# Patient Record
Sex: MALE | Race: WHITE | NOT HISPANIC OR LATINO | Employment: OTHER | URBAN - NONMETROPOLITAN AREA
[De-identification: names, ages, dates, MRNs, and addresses within clinical notes are randomized per-mention and may not be internally consistent; named-entity substitution may affect disease eponyms.]

---

## 2017-05-17 ENCOUNTER — OFFICE VISIT (OUTPATIENT)
Dept: CARDIOLOGY | Facility: CLINIC | Age: 47
End: 2017-05-17

## 2017-05-17 VITALS
HEART RATE: 62 BPM | RESPIRATION RATE: 18 BRPM | BODY MASS INDEX: 20.22 KG/M2 | WEIGHT: 103 LBS | HEIGHT: 60 IN | OXYGEN SATURATION: 96 % | SYSTOLIC BLOOD PRESSURE: 118 MMHG | DIASTOLIC BLOOD PRESSURE: 78 MMHG

## 2017-05-17 DIAGNOSIS — I27.20 MODERATE TO SEVERE PULMONARY HYPERTENSION (HCC): ICD-10-CM

## 2017-05-17 DIAGNOSIS — Q24.9 CONGENITAL HEART DISEASE: Primary | ICD-10-CM

## 2017-05-17 DIAGNOSIS — I48.3 TYPICAL ATRIAL FLUTTER (HCC): ICD-10-CM

## 2017-05-17 DIAGNOSIS — I44.1 WENCKEBACH: ICD-10-CM

## 2017-05-17 PROCEDURE — 93000 ELECTROCARDIOGRAM COMPLETE: CPT | Performed by: PHYSICIAN ASSISTANT

## 2017-05-17 PROCEDURE — 99213 OFFICE O/P EST LOW 20 MIN: CPT | Performed by: PHYSICIAN ASSISTANT

## 2018-07-19 ENCOUNTER — OFFICE VISIT (OUTPATIENT)
Dept: CARDIOLOGY | Facility: CLINIC | Age: 48
End: 2018-07-19

## 2018-07-19 VITALS
HEIGHT: 60 IN | DIASTOLIC BLOOD PRESSURE: 72 MMHG | RESPIRATION RATE: 16 BRPM | SYSTOLIC BLOOD PRESSURE: 130 MMHG | HEART RATE: 60 BPM | WEIGHT: 103.4 LBS | BODY MASS INDEX: 20.3 KG/M2

## 2018-07-19 DIAGNOSIS — Q24.9 CONGENITAL HEART DISEASE: ICD-10-CM

## 2018-07-19 DIAGNOSIS — I27.20 MODERATE TO SEVERE PULMONARY HYPERTENSION (HCC): Primary | ICD-10-CM

## 2018-07-19 PROCEDURE — 93000 ELECTROCARDIOGRAM COMPLETE: CPT | Performed by: PHYSICIAN ASSISTANT

## 2018-07-19 PROCEDURE — 99213 OFFICE O/P EST LOW 20 MIN: CPT | Performed by: PHYSICIAN ASSISTANT

## 2018-07-19 NOTE — PROGRESS NOTES
Jeramy Lai MD  Trevor Navarro  1970 07/19/2018    Patient Active Problem List   Diagnosis   • Congenital heart disease with pulmonary stenosis and atrial septal defect repain as a child.    • Moderate pulmonary hypertension on last echocardiogram in October 2014.   • Atrial flutter, with CHADS-VASC score of 0, hence low stroke rish without anticoagulation.        Dear Jeramy Lai MD:    Subjective       History of Present Illness:    Chief Compliant: Atrial flutter    Trevor Navarro is a pleasant 48 y.o. male with a past medical history significant for congenital heart disease with repair of ASD and pulmonic stenosis as a child. He also has a history of atrial flutter with controlled rate and mobitz type I AV block which was previously persistent and asymptomatic.  He comes in today for cardiology follow-up.  His father answers all of his questions for him.  His father states that he's been asymptomatic with no complaints stating that he has not complained of chest pain, palpitations, shortness of breath, dizziness, or syncope.  He states his heart rate normally stays pretty low and has not gotten above on drip that he can tell.  He is still healthy and does not have blood pressure problems, diabetes, or any coronary artery disease or any history of stroke or TIA.      Not on File:      Current Outpatient Prescriptions:   •  aspirin 81 MG EC tablet, Take 81 mg by mouth daily., Disp: , Rfl:   •  loratadine (CLARITIN) 5 MG/5ML syrup, Take  by mouth., Disp: , Rfl:   •  PHENobarbital (LUMINAL) 32.4 MG tablet, Take 16.2 mg by mouth 2 (two) times a day., Disp: , Rfl:       The following portions of the patient's history were reviewed and updated as appropriate: allergies, current medications, past family history, past medical history, past social history, past surgical history and problem list.    Social History   Substance Use Topics   • Smoking status: Never Smoker   • Smokeless tobacco:  "Never Used   • Alcohol use No       Review of Systems   Unable to perform ROS: patient nonverbal       Objective   Vitals:    07/19/18 1429   BP: 130/72   BP Location: Right arm   Pulse: 60   Resp: 16   Weight: 46.9 kg (103 lb 6.4 oz)   Height: 142.2 cm (55.98\")     Body mass index is 23.2 kg/m².        Physical Exam   Constitutional: He appears well-nourished. No distress.   Cardiovascular: Normal heart sounds.  An irregularly irregular rhythm present. Exam reveals no gallop.    No murmur heard.  Pulmonary/Chest: Effort normal and breath sounds normal.   Musculoskeletal: He exhibits no edema.   Neurological:   Patient with intellectual disability.   Skin: He is not diaphoretic.       No results found for: NA, K, CL, CO2, BUN, CREATININE, LABGLOM, GLUCOSE, CALCIUM, AST, ALT, ALKPHOS, LABIL2  No results found for: CKTOTAL  No results found for: WBC, HGB, HCT, PLT  No results found for: INR  No results found for: MG  No results found for: TSH, PSA, CHLPL, TRIG, HDL, LDL   No results found for: BNP          ECG 12 Lead  Date/Time: 7/19/2018 2:32 PM  Performed by: SHANNAN KAUR  Authorized by: SHANNAN KAUR   Comparison: compared with previous ECG from 5/17/2017  Similar to previous ECG  Rhythm: atrial flutter  Clinical impression: abnormal ECG              Assessment/Plan    Diagnosis Plan   1. Moderate pulmonary hypertension on last echocardiogram in October 2014.  Adult Transthoracic Echo Complete W/ Cont if Necessary Per Protocol   2. Congenital heart disease with pulmonary stenosis and atrial septal defect repain as a child.                   Recommendations:  1. Since it has been over a year since we last checked an echo for his pulmonary hypertension, I will order one to reevaluate.   1. Chads VASC score remains at 0.  Hence, we'll not pursue anticoagulation at this time.  2. Follow up in one year.   Return in about 1 year (around 7/19/2019).    As always, I appreciate very much the opportunity to " participate in the cardiovascular care of your patients.      With Best Regards,    JAMAL Delgado disclaimer:  Much of this encounter note is an electronic transcription/translation of spoken language to printed text. The electronic translation of spoken language may permit erroneous, or at times, nonsensical words or phrases to be inadvertently transcribed; Although I have reviewed the note for such errors, some may still exist.

## 2018-07-23 ENCOUNTER — HOSPITAL ENCOUNTER (OUTPATIENT)
Dept: CARDIOLOGY | Facility: HOSPITAL | Age: 48
Discharge: HOME | End: 2018-07-23
Admitting: PHYSICIAN ASSISTANT

## 2018-07-23 DIAGNOSIS — I27.20 MODERATE TO SEVERE PULMONARY HYPERTENSION (HCC): ICD-10-CM

## 2018-07-23 PROCEDURE — 93306 TTE W/DOPPLER COMPLETE: CPT

## 2018-07-23 PROCEDURE — 93306 TTE W/DOPPLER COMPLETE: CPT | Performed by: INTERNAL MEDICINE

## 2018-07-27 LAB
BH CV ECHO MEAS - BSA(HAYCOCK): 1.4 M^2
BH CV ECHO MEAS - BSA: 1.3 M^2
BH CV ECHO MEAS - BZI_BMI: 23.1 KILOGRAMS/M^2
BH CV ECHO MEAS - BZI_METRIC_HEIGHT: 142.2 CM
BH CV ECHO MEAS - BZI_METRIC_WEIGHT: 46.7 KG
BH CV ECHO MEAS - CONTRAST EF 4CH: 61.9 ML/M^2
BH CV ECHO MEAS - EDV(MOD-SP4): 21 ML
BH CV ECHO MEAS - EF(MOD-SP4): 61.9 %
BH CV ECHO MEAS - ESV(MOD-SP4): 8 ML
BH CV ECHO MEAS - LV DIASTOLIC VOL/BSA (35-75): 15.7 ML/M^2
BH CV ECHO MEAS - LV SYSTOLIC VOL/BSA (12-30): 6 ML/M^2
BH CV ECHO MEAS - LVLD AP4: 5.8 CM
BH CV ECHO MEAS - LVLS AP4: 5.9 CM
BH CV ECHO MEAS - MV A MAX VEL: 38 CM/SEC
BH CV ECHO MEAS - MV E MAX VEL: 94.3 CM/SEC
BH CV ECHO MEAS - MV E/A: 2.5
BH CV ECHO MEAS - RAP SYSTOLE: 10 MMHG
BH CV ECHO MEAS - RVSP: 55.7 MMHG
BH CV ECHO MEAS - SI(MOD-SP4): 9.7 ML/M^2
BH CV ECHO MEAS - SV(MOD-SP4): 13 ML
BH CV ECHO MEAS - TR MAX VEL: 338 CM/SEC
MAXIMAL PREDICTED HEART RATE: 173 BPM
STRESS TARGET HR: 147 BPM

## 2018-07-30 ENCOUNTER — TELEPHONE (OUTPATIENT)
Dept: CARDIOLOGY | Facility: CLINIC | Age: 48
End: 2018-07-30

## 2018-07-30 DIAGNOSIS — I27.20 MODERATE TO SEVERE PULMONARY HYPERTENSION (HCC): Primary | ICD-10-CM

## 2018-07-30 NOTE — TELEPHONE ENCOUNTER
"Spoke with Carmen mother.  Per his mother,  Trevor will not as much keep a bandaid on his finger without tearing it off and \"throwing a fit\" much less a pulse ox all night (being special needs\". SHe stated he wouldn't do it.    "

## 2018-07-30 NOTE — TELEPHONE ENCOUNTER
----- Message from Kaiden Anegles PA-C sent at 7/30/2018 12:19 PM EDT -----  Let the patient know that his pressures in his lungs were still high so im gonna check an over night pulse ox. Please have him come in in a few weeks after he has the pulse ox done (if willing to do it)

## 2019-07-15 ENCOUNTER — OFFICE VISIT (OUTPATIENT)
Dept: CARDIOLOGY | Facility: CLINIC | Age: 49
End: 2019-07-15

## 2019-07-15 VITALS
HEIGHT: 60 IN | SYSTOLIC BLOOD PRESSURE: 127 MMHG | OXYGEN SATURATION: 95 % | DIASTOLIC BLOOD PRESSURE: 87 MMHG | BODY MASS INDEX: 20.81 KG/M2 | HEART RATE: 59 BPM | WEIGHT: 106 LBS

## 2019-07-15 DIAGNOSIS — I27.20 MODERATE TO SEVERE PULMONARY HYPERTENSION (HCC): ICD-10-CM

## 2019-07-15 DIAGNOSIS — I48.3 TYPICAL ATRIAL FLUTTER (HCC): ICD-10-CM

## 2019-07-15 DIAGNOSIS — Q24.9 CONGENITAL HEART DISEASE: Primary | ICD-10-CM

## 2019-07-15 PROCEDURE — 93000 ELECTROCARDIOGRAM COMPLETE: CPT | Performed by: NURSE PRACTITIONER

## 2019-07-15 PROCEDURE — 99213 OFFICE O/P EST LOW 20 MIN: CPT | Performed by: NURSE PRACTITIONER

## 2019-07-15 NOTE — PROGRESS NOTES
Jeramy Lai MD  Trevor Navarro  1970  07/15/2019    Patient Active Problem List   Diagnosis   • Congenital heart disease with pulmonary stenosis and atrial septal defect repain as a child.    • Moderate to severe pulmonary hypertension (CMS/HCC)   • Atrial flutter, with CHADS-VASC score of 0, hence low stroke rish without anticoagulation.        Dear Jeramy Lai MD:    Subjective     Chief Complaint   Patient presents with   • Moderate to severe pulmonary hypertension   • Congenital heart disease with pulmonary stenosis and atrial            History of Present Illness:    Trevor Navarro is a 49 y.o. male with a history of congenital heart disease with repair of ASD and pulmonic stenosis as a child.  He also has a history of atrial flutter with controlled rate, CHADSVASc score of 0 and Mobitz type I AV block which was previously persistent and asymptomatic.  He presents today for routine cardiology follow-up.  His father accompanies him on his visit today.  He does have a developmental delay.  His father reports he has been doing well.  He has occasional joint pain.  Otherwise, he has had no complaints of chest pain, shortness of breath, palpitations, dizziness, or lightheadedness.  Previously, an echocardiogram was ordered to evaluate the degree of pulmonary hypertension.  This revealed severe pulmonary hypertension.  However, the patient's parents declined further work-up as they did not feel he could tolerate any of this.  He does often awaken gasping for breath at night.  However, the patient's parents report he would not wear any type of CPAP or BiPAP if needed.          No Known Allergies:      Current Outpatient Medications:   •  aspirin 81 MG EC tablet, Take 81 mg by mouth daily., Disp: , Rfl:   •  loratadine (CLARITIN) 5 MG/5ML syrup, Take  by mouth., Disp: , Rfl:   •  PHENobarbital (LUMINAL) 32.4 MG tablet, Take 16.2 mg by mouth 2 (two) times a day., Disp: , Rfl:       The  "following portions of the patient's history were reviewed and updated as appropriate: allergies, current medications, past family history, past medical history, past social history, past surgical history and problem list.    Social History     Tobacco Use   • Smoking status: Never Smoker   • Smokeless tobacco: Never Used   Substance Use Topics   • Alcohol use: No   • Drug use: No       Review of Systems   Unable to perform ROS: patient nonverbal       Objective   Vitals:    07/15/19 0926   BP: 127/87   BP Location: Left arm   Patient Position: Sitting   Cuff Size: Adult   Pulse: 59   SpO2: 95%   Weight: 48.1 kg (106 lb)   Height: 142.2 cm (55.98\")     Body mass index is 23.78 kg/m².        Physical Exam   Constitutional: He appears well-developed and well-nourished.   HENT:   Head: Normocephalic and atraumatic.   Cardiovascular: Normal rate and normal heart sounds. An irregularly irregular rhythm present. Exam reveals no S3 and no S4.   No murmur heard.  Pulmonary/Chest: Effort normal and breath sounds normal. He has no wheezes. He has no rales.   Abdominal: Soft. Bowel sounds are normal.   Musculoskeletal: He exhibits no edema.   Neurological: He is alert.   Skin: Skin is warm and dry.   Psychiatric: He has a normal mood and affect. His behavior is normal.               ECG 12 Lead  Date/Time: 7/15/2019 9:31 AM  Performed by: Madhavi Martins APRN  Authorized by: Madhavi Martins APRN   Comparison: compared with previous ECG   Similar to previous ECG  Rhythm: atrial flutter  BPM: 62                Assessment/Plan    Diagnosis Plan   1. Congenital heart disease with pulmonary stenosis and atrial septal defect repain as a child.   Adult Transthoracic Echo Complete W/ Cont if Necessary Per Protocol   2. Moderate to severe pulmonary hypertension (CMS/HCC)  Adult Transthoracic Echo Complete W/ Cont if Necessary Per Protocol   3. Typical atrial flutter (CMS/HCC)  Adult Transthoracic Echo Complete W/ Cont if Necessary " Per Protocol                Recommendations:    1. Will re-evaluate the degree of pulmonary hypertension with an echocardiogram.    2. His CHADSVASc score is 0, therefore he does not need anticoagulation.    3. Follow up in 1 year and PRN.      Return in about 1 year (around 7/15/2020) for Recheck.    As always, I appreciate very much the opportunity to participate in the cardiovascular care of your patients.      With Best Regards,    ANNABELLE Ambriz

## 2019-07-24 ENCOUNTER — HOSPITAL ENCOUNTER (OUTPATIENT)
Dept: CARDIOLOGY | Facility: HOSPITAL | Age: 49
Discharge: HOME | End: 2019-07-24
Admitting: NURSE PRACTITIONER

## 2019-07-24 DIAGNOSIS — I48.3 TYPICAL ATRIAL FLUTTER (HCC): ICD-10-CM

## 2019-07-24 DIAGNOSIS — Q24.9 CONGENITAL HEART DISEASE: ICD-10-CM

## 2019-07-24 DIAGNOSIS — I27.20 MODERATE TO SEVERE PULMONARY HYPERTENSION (HCC): ICD-10-CM

## 2019-07-24 PROCEDURE — 93306 TTE W/DOPPLER COMPLETE: CPT | Performed by: INTERNAL MEDICINE

## 2019-07-24 PROCEDURE — 93306 TTE W/DOPPLER COMPLETE: CPT

## 2019-07-25 LAB
BH CV ECHO MEAS - % IVS THICK: 52.8 %
BH CV ECHO MEAS - % LVPW THICK: 42.8 %
BH CV ECHO MEAS - ACS: 1.5 CM
BH CV ECHO MEAS - AO MAX PG: 9.8 MMHG
BH CV ECHO MEAS - AO MEAN PG: 4.8 MMHG
BH CV ECHO MEAS - AO ROOT AREA (BSA CORRECTED): 1.6
BH CV ECHO MEAS - AO ROOT AREA: 3.7 CM^2
BH CV ECHO MEAS - AO ROOT DIAM: 2.2 CM
BH CV ECHO MEAS - AO V2 MAX: 156.9 CM/SEC
BH CV ECHO MEAS - AO V2 MEAN: 101.7 CM/SEC
BH CV ECHO MEAS - AO V2 VTI: 30.8 CM
BH CV ECHO MEAS - BSA(HAYCOCK): 1.4 M^2
BH CV ECHO MEAS - BSA: 1.3 M^2
BH CV ECHO MEAS - BZI_BMI: 24.6 KILOGRAMS/M^2
BH CV ECHO MEAS - BZI_METRIC_HEIGHT: 139.7 CM
BH CV ECHO MEAS - BZI_METRIC_WEIGHT: 48.1 KG
BH CV ECHO MEAS - EDV(CUBED): 54.5 ML
BH CV ECHO MEAS - EDV(MOD-SP4): 50 ML
BH CV ECHO MEAS - EDV(TEICH): 61.6 ML
BH CV ECHO MEAS - EF(CUBED): 56.6 %
BH CV ECHO MEAS - EF(MOD-SP4): 62 %
BH CV ECHO MEAS - EF(TEICH): 49 %
BH CV ECHO MEAS - ESV(CUBED): 23.7 ML
BH CV ECHO MEAS - ESV(MOD-SP4): 19 ML
BH CV ECHO MEAS - ESV(TEICH): 31.4 ML
BH CV ECHO MEAS - FS: 24.3 %
BH CV ECHO MEAS - IVS/LVPW: 1.2
BH CV ECHO MEAS - IVSD: 0.89 CM
BH CV ECHO MEAS - IVSS: 1.4 CM
BH CV ECHO MEAS - LA DIMENSION: 3.4 CM
BH CV ECHO MEAS - LA/AO: 1.6
BH CV ECHO MEAS - LV DIASTOLIC VOL/BSA (35-75): 37.4 ML/M^2
BH CV ECHO MEAS - LV MASS(C)D: 88 GRAMS
BH CV ECHO MEAS - LV MASS(C)DI: 65.8 GRAMS/M^2
BH CV ECHO MEAS - LV MASS(C)S: 104.1 GRAMS
BH CV ECHO MEAS - LV MASS(C)SI: 77.8 GRAMS/M^2
BH CV ECHO MEAS - LV SYSTOLIC VOL/BSA (12-30): 14.2 ML/M^2
BH CV ECHO MEAS - LVIDD: 3.8 CM
BH CV ECHO MEAS - LVIDS: 2.9 CM
BH CV ECHO MEAS - LVLD AP4: 6.2 CM
BH CV ECHO MEAS - LVLS AP4: 6 CM
BH CV ECHO MEAS - LVOT AREA (M): 2 CM^2
BH CV ECHO MEAS - LVOT AREA: 2 CM^2
BH CV ECHO MEAS - LVOT DIAM: 1.6 CM
BH CV ECHO MEAS - LVPWD: 0.74 CM
BH CV ECHO MEAS - LVPWS: 1.1 CM
BH CV ECHO MEAS - MV A MAX VEL: 71.6 CM/SEC
BH CV ECHO MEAS - MV E MAX VEL: 124.5 CM/SEC
BH CV ECHO MEAS - MV E/A: 1.7
BH CV ECHO MEAS - PA MAX PG (FULL): 10.3 MMHG
BH CV ECHO MEAS - PA MAX PG: 16.3 MMHG
BH CV ECHO MEAS - PA MEAN PG (FULL): 3.5 MMHG
BH CV ECHO MEAS - PA MEAN PG: 5.6 MMHG
BH CV ECHO MEAS - PA V2 MAX: 202.1 CM/SEC
BH CV ECHO MEAS - PA V2 MEAN: 104 CM/SEC
BH CV ECHO MEAS - PA V2 VTI: 36.3 CM
BH CV ECHO MEAS - PVA(I,A): 1.4 CM^2
BH CV ECHO MEAS - PVA(I,D): 1.4 CM^2
BH CV ECHO MEAS - PVA(V,A): 1.4 CM^2
BH CV ECHO MEAS - PVA(V,D): 1.4 CM^2
BH CV ECHO MEAS - RAP SYSTOLE: 10 MMHG
BH CV ECHO MEAS - RV MAX PG: 6 MMHG
BH CV ECHO MEAS - RV MEAN PG: 2.1 MMHG
BH CV ECHO MEAS - RV V1 MAX: 122.8 CM/SEC
BH CV ECHO MEAS - RV V1 MEAN: 63.5 CM/SEC
BH CV ECHO MEAS - RV V1 VTI: 22.4 CM
BH CV ECHO MEAS - RVOT AREA: 2.3 CM^2
BH CV ECHO MEAS - RVOT DIAM: 1.7 CM
BH CV ECHO MEAS - RVSP: 56.5 MMHG
BH CV ECHO MEAS - SI(AO): 85.9 ML/M^2
BH CV ECHO MEAS - SI(CUBED): 23 ML/M^2
BH CV ECHO MEAS - SI(MOD-SP4): 23.2 ML/M^2
BH CV ECHO MEAS - SI(TEICH): 22.5 ML/M^2
BH CV ECHO MEAS - SV(AO): 114.9 ML
BH CV ECHO MEAS - SV(CUBED): 30.8 ML
BH CV ECHO MEAS - SV(MOD-SP4): 31 ML
BH CV ECHO MEAS - SV(RVOT): 51.9 ML
BH CV ECHO MEAS - SV(TEICH): 30.2 ML
BH CV ECHO MEAS - TR MAX VEL: 340.9 CM/SEC
MAXIMAL PREDICTED HEART RATE: 172 BPM
STRESS TARGET HR: 146 BPM

## 2019-07-31 ENCOUNTER — TELEPHONE (OUTPATIENT)
Dept: CARDIOLOGY | Facility: CLINIC | Age: 49
End: 2019-07-31

## 2019-07-31 NOTE — TELEPHONE ENCOUNTER
----- Message from ANNABELLE Ambriz sent at 7/31/2019  9:36 AM EDT -----  EF is normal. He does have persistent severe pulmonary hypertension. The next step would be to order overnight oximetry to rule out sleep apnea. However, I do not believe his family wants to pursue this. Please make sure with them. Thank you.

## 2019-07-31 NOTE — TELEPHONE ENCOUNTER
Called and spoke with Carmen' mom and given message per DEE DEE ALANIS.  She wishes not to do the overnight pulse ox as he will not wear anything as a bandaid on his body.

## 2020-10-01 ENCOUNTER — OFFICE VISIT (OUTPATIENT)
Dept: CARDIOLOGY | Facility: CLINIC | Age: 50
End: 2020-10-01

## 2020-10-01 VITALS
TEMPERATURE: 98 F | DIASTOLIC BLOOD PRESSURE: 76 MMHG | BODY MASS INDEX: 24.45 KG/M2 | SYSTOLIC BLOOD PRESSURE: 130 MMHG | WEIGHT: 109 LBS | HEART RATE: 61 BPM

## 2020-10-01 DIAGNOSIS — I27.20 MODERATE TO SEVERE PULMONARY HYPERTENSION (HCC): ICD-10-CM

## 2020-10-01 DIAGNOSIS — Q24.9 CONGENITAL HEART DISEASE: ICD-10-CM

## 2020-10-01 DIAGNOSIS — I48.3 TYPICAL ATRIAL FLUTTER (HCC): Primary | ICD-10-CM

## 2020-10-01 PROCEDURE — 99441 PR PHYS/QHP TELEPHONE EVALUATION 5-10 MIN: CPT | Performed by: NURSE PRACTITIONER

## 2020-10-01 NOTE — PROGRESS NOTES
You have chosen to receive care through a telephone visit. Do you consent to use a telephone visit for your medical care today? Yes  Jeramy Lai MD  Trevor Navarro  1970  10/01/2020    Patient Active Problem List   Diagnosis   • Congenital heart disease with pulmonary stenosis and atrial septal defect repain as a child.    • Moderate to severe pulmonary hypertension (CMS/HCC)   • Atrial flutter, with CHADS-VASC score of 0, hence low stroke rish without anticoagulation.        Dear Jeramy Lai MD:    Subjective     Chief Complaint   Patient presents with   • Follow-up     1 yr    • Shortness of Breath      but it is the same as it has been    • Med Management     verbal           History of Present Illness:    Trevor Navarro is a 50 y.o. male with a past medical history significant for congenital heart disease with repair of ASD and pulmonic stenosis as a child. He has a history of atrial flutter with controlled rate, CHADSVASc score of 0 and Mobitz type I AV block which was previously asymptomatic. He presents today for cardiology follow up via telephone visit. He does have intellectual disability, therefore, his Father is also facilitating the visit. He reports he has been doing well. He denies any complaints of chest pain, dyspnea, or palpitations. An echocardiogram revealed an EF of 51-55% with mild mitral valve regurgitation and severe pulmonary hypertension, stable compared to last study. He does have Obstructive sleep apnea but is unable to tolerate a C-Pap.          No Known Allergies:      Current Outpatient Medications:   •  aspirin 81 MG EC tablet, Take 81 mg by mouth daily., Disp: , Rfl:   •  loratadine (CLARITIN) 5 MG/5ML syrup, Take  by mouth., Disp: , Rfl:   •  PHENobarbital (LUMINAL) 32.4 MG tablet, Take 16.2 mg by mouth 2 (two) times a day., Disp: , Rfl:       The following portions of the patient's history were reviewed and updated as appropriate: allergies, current  medications, past family history, past medical history, past social history, past surgical history and problem list.    Social History     Tobacco Use   • Smoking status: Never Smoker   • Smokeless tobacco: Never Used   Substance Use Topics   • Alcohol use: No   • Drug use: No       Review of Systems   Constitution: Negative for decreased appetite and malaise/fatigue.   Cardiovascular: Negative for chest pain, dyspnea on exertion, irregular heartbeat, leg swelling, near-syncope, orthopnea, palpitations, paroxysmal nocturnal dyspnea and syncope.   Respiratory: Negative for cough, shortness of breath and wheezing.    Neurological: Negative for dizziness, light-headedness and weakness.       Objective   Vitals:    10/01/20 1008   BP: 130/76   BP Location: Left arm   Patient Position: Sitting   Cuff Size: Large Adult   Pulse: 61   Temp: 98 °F (36.7 °C)   TempSrc: Temporal   Weight: 49.4 kg (109 lb)     Body mass index is 24.45 kg/m².        Physical Exam        Procedures      Assessment/Plan    Diagnosis Plan   1. Typical atrial flutter (CMS/HCC)     2. Congenital heart disease with pulmonary stenosis and atrial septal defect repain as a child.      3. Moderate to severe pulmonary hypertension (CMS/HCC)                  Recommendations:    1. I reviewed echocardiogram results with the patient's father.     2. The pulmonary hypertension may be secondary to the untreated ADITYA, however the patient has tried C-Pap in the past and cannot tolerate.    3. Follow up in 6 to 7 months in office with EKG or sooner if needed.    This visit has been rescheduled as a phone visit to comply with patient safety concerns in accordance with CDC recommendations. Total time of discussion was 7 minutes.          Patient's Body mass index is 24.45 kg/m². BMI is within normal parameters. No follow-up required..         Return in about 6 months (around 4/1/2021) for Recheck.    As always, I appreciate very much the opportunity to participate in  the cardiovascular care of your patients.      With Best Regards,    ANNABELLE Ambriz

## 2021-03-01 ENCOUNTER — APPOINTMENT (OUTPATIENT)
Dept: CT IMAGING | Facility: HOSPITAL | Age: 51
End: 2021-03-01

## 2021-03-01 ENCOUNTER — APPOINTMENT (OUTPATIENT)
Dept: GENERAL RADIOLOGY | Facility: HOSPITAL | Age: 51
End: 2021-03-01

## 2021-03-01 ENCOUNTER — HOSPITAL ENCOUNTER (EMERGENCY)
Facility: HOSPITAL | Age: 51
Discharge: HOME | End: 2021-03-01
Attending: FAMILY MEDICINE | Admitting: FAMILY MEDICINE

## 2021-03-01 VITALS
HEART RATE: 100 BPM | SYSTOLIC BLOOD PRESSURE: 138 MMHG | BODY MASS INDEX: 22.58 KG/M2 | HEIGHT: 60 IN | RESPIRATION RATE: 20 BRPM | TEMPERATURE: 98.8 F | WEIGHT: 115 LBS | DIASTOLIC BLOOD PRESSURE: 97 MMHG | OXYGEN SATURATION: 99 %

## 2021-03-01 DIAGNOSIS — B96.89 ACUTE BACTERIAL BRONCHITIS: Primary | ICD-10-CM

## 2021-03-01 DIAGNOSIS — J20.8 ACUTE BACTERIAL BRONCHITIS: Primary | ICD-10-CM

## 2021-03-01 DIAGNOSIS — R91.8 PULMONARY NODULES: ICD-10-CM

## 2021-03-01 LAB
ALBUMIN SERPL-MCNC: 4.09 G/DL (ref 3.5–5.2)
ALBUMIN/GLOB SERPL: 0.9 G/DL
ALP SERPL-CCNC: 91 U/L (ref 39–117)
ALT SERPL W P-5'-P-CCNC: 23 U/L (ref 1–41)
ANION GAP SERPL CALCULATED.3IONS-SCNC: 12.9 MMOL/L (ref 5–15)
AST SERPL-CCNC: 17 U/L (ref 1–40)
BACTERIA UR QL AUTO: NORMAL /HPF
BASOPHILS # BLD AUTO: 0.06 10*3/MM3 (ref 0–0.2)
BASOPHILS NFR BLD AUTO: 0.6 % (ref 0–1.5)
BILIRUB SERPL-MCNC: 0.3 MG/DL (ref 0–1.2)
BILIRUB UR QL STRIP: NEGATIVE
BUN SERPL-MCNC: 17 MG/DL (ref 6–20)
BUN/CREAT SERPL: 16.7 (ref 7–25)
CALCIUM SPEC-SCNC: 9.7 MG/DL (ref 8.6–10.5)
CHLORIDE SERPL-SCNC: 96 MMOL/L (ref 98–107)
CLARITY UR: CLEAR
CO2 SERPL-SCNC: 26.1 MMOL/L (ref 22–29)
COLOR UR: YELLOW
CREAT SERPL-MCNC: 1.02 MG/DL (ref 0.76–1.27)
DEPRECATED RDW RBC AUTO: 46.9 FL (ref 37–54)
EOSINOPHIL # BLD AUTO: 0.12 10*3/MM3 (ref 0–0.4)
EOSINOPHIL NFR BLD AUTO: 1.1 % (ref 0.3–6.2)
ERYTHROCYTE [DISTWIDTH] IN BLOOD BY AUTOMATED COUNT: 13.1 % (ref 12.3–15.4)
FLUAV RNA RESP QL NAA+PROBE: NOT DETECTED
FLUBV RNA RESP QL NAA+PROBE: NOT DETECTED
GFR SERPL CREATININE-BSD FRML MDRD: 78 ML/MIN/1.73
GLOBULIN UR ELPH-MCNC: 4.7 GM/DL
GLUCOSE SERPL-MCNC: 112 MG/DL (ref 65–99)
GLUCOSE UR STRIP-MCNC: NEGATIVE MG/DL
HCT VFR BLD AUTO: 47.7 % (ref 37.5–51)
HGB BLD-MCNC: 15.4 G/DL (ref 13–17.7)
HGB UR QL STRIP.AUTO: NEGATIVE
HOLD SPECIMEN: NORMAL
HOLD SPECIMEN: NORMAL
HYALINE CASTS UR QL AUTO: NORMAL /LPF
IMM GRANULOCYTES # BLD AUTO: 0.08 10*3/MM3 (ref 0–0.05)
IMM GRANULOCYTES NFR BLD AUTO: 0.7 % (ref 0–0.5)
KETONES UR QL STRIP: NEGATIVE
LEUKOCYTE ESTERASE UR QL STRIP.AUTO: NEGATIVE
LYMPHOCYTES # BLD AUTO: 2.69 10*3/MM3 (ref 0.7–3.1)
LYMPHOCYTES NFR BLD AUTO: 25.2 % (ref 19.6–45.3)
MCH RBC QN AUTO: 31.3 PG (ref 26.6–33)
MCHC RBC AUTO-ENTMCNC: 32.3 G/DL (ref 31.5–35.7)
MCV RBC AUTO: 97 FL (ref 79–97)
MONOCYTES # BLD AUTO: 1.06 10*3/MM3 (ref 0.1–0.9)
MONOCYTES NFR BLD AUTO: 9.9 % (ref 5–12)
NEUTROPHILS NFR BLD AUTO: 6.68 10*3/MM3 (ref 1.7–7)
NEUTROPHILS NFR BLD AUTO: 62.5 % (ref 42.7–76)
NITRITE UR QL STRIP: NEGATIVE
NRBC BLD AUTO-RTO: 0 /100 WBC (ref 0–0.2)
NT-PROBNP SERPL-MCNC: 1131 PG/ML (ref 0–450)
PH UR STRIP.AUTO: 5.5 [PH] (ref 5–8)
PLATELET # BLD AUTO: 396 10*3/MM3 (ref 140–450)
PMV BLD AUTO: 9.8 FL (ref 6–12)
POTASSIUM SERPL-SCNC: 4.5 MMOL/L (ref 3.5–5.2)
PROT SERPL-MCNC: 8.8 G/DL (ref 6–8.5)
PROT UR QL STRIP: ABNORMAL
RBC # BLD AUTO: 4.92 10*6/MM3 (ref 4.14–5.8)
RBC # UR: NORMAL /HPF
REF LAB TEST METHOD: NORMAL
SARS-COV-2 RNA RESP QL NAA+PROBE: NOT DETECTED
SODIUM SERPL-SCNC: 135 MMOL/L (ref 136–145)
SP GR UR STRIP: 1.01 (ref 1–1.03)
SQUAMOUS #/AREA URNS HPF: NORMAL /HPF
TROPONIN T SERPL-MCNC: <0.01 NG/ML (ref 0–0.03)
UROBILINOGEN UR QL STRIP: ABNORMAL
WBC # BLD AUTO: 10.69 10*3/MM3 (ref 3.4–10.8)
WBC UR QL AUTO: NORMAL /HPF
WHOLE BLOOD HOLD SPECIMEN: NORMAL
WHOLE BLOOD HOLD SPECIMEN: NORMAL

## 2021-03-01 PROCEDURE — 87636 SARSCOV2 & INF A&B AMP PRB: CPT | Performed by: PHYSICIAN ASSISTANT

## 2021-03-01 PROCEDURE — 71045 X-RAY EXAM CHEST 1 VIEW: CPT | Performed by: RADIOLOGY

## 2021-03-01 PROCEDURE — 99283 EMERGENCY DEPT VISIT LOW MDM: CPT

## 2021-03-01 PROCEDURE — 84484 ASSAY OF TROPONIN QUANT: CPT | Performed by: PHYSICIAN ASSISTANT

## 2021-03-01 PROCEDURE — 80053 COMPREHEN METABOLIC PANEL: CPT | Performed by: PHYSICIAN ASSISTANT

## 2021-03-01 PROCEDURE — 71275 CT ANGIOGRAPHY CHEST: CPT

## 2021-03-01 PROCEDURE — 71275 CT ANGIOGRAPHY CHEST: CPT | Performed by: RADIOLOGY

## 2021-03-01 PROCEDURE — 81001 URINALYSIS AUTO W/SCOPE: CPT | Performed by: PHYSICIAN ASSISTANT

## 2021-03-01 PROCEDURE — 0 IOPAMIDOL PER 1 ML: Performed by: FAMILY MEDICINE

## 2021-03-01 PROCEDURE — 85025 COMPLETE CBC W/AUTO DIFF WBC: CPT | Performed by: PHYSICIAN ASSISTANT

## 2021-03-01 PROCEDURE — 83880 ASSAY OF NATRIURETIC PEPTIDE: CPT | Performed by: PHYSICIAN ASSISTANT

## 2021-03-01 PROCEDURE — 71045 X-RAY EXAM CHEST 1 VIEW: CPT

## 2021-03-01 PROCEDURE — 87040 BLOOD CULTURE FOR BACTERIA: CPT | Performed by: PHYSICIAN ASSISTANT

## 2021-03-01 RX ORDER — CEFDINIR 250 MG/5ML
300 POWDER, FOR SUSPENSION ORAL 2 TIMES DAILY
Qty: 120 ML | Refills: 0 | Status: SHIPPED | OUTPATIENT
Start: 2021-03-01 | End: 2021-04-01

## 2021-03-01 RX ADMIN — IOPAMIDOL 100 ML: 755 INJECTION, SOLUTION INTRAVENOUS at 13:41

## 2021-03-01 NOTE — ED PROVIDER NOTES
"Subjective   50-year-old white male presents secondary to 1 week history of cough congestion and fever.  History is provided by his father.  Patient has not had any known exposure to COVID-19.  He does have a history of heart disease which was repaired when he was young.  He is followed by Dr. Peterson and a cardiologist in [place].  No increased lower extremity swelling.  He has had some nasal drainage and sore throat.  The father is being seen for similar symptoms though milder.  He is eating and drinking well.  No other complaints this time.          Review of Systems   Constitutional: Positive for fever.   HENT: Negative.    Respiratory: Positive for cough.    Cardiovascular: Negative.  Negative for chest pain.   Gastrointestinal: Negative.  Negative for abdominal pain.   Endocrine: Negative.    Genitourinary: Negative.  Negative for dysuria.   Skin: Negative.    Neurological: Negative.    Psychiatric/Behavioral: Negative.    All other systems reviewed and are negative.      Past Medical History:   Diagnosis Date   • Aortic insufficiency     MILD REGURGITAION, AND MILD TRICUSPID REGURGITAION.   • ASD (atrial septal defect)    • Atrial flutter (CMS/HCC)     [facility] CHADS-VASC SCORE OF 0, HENCE LOW STROKE RISK WITHOUT ANTICOAGULATION.   • AV block, Mobitz 1    • Bradycardia     WITH A SECOND DEGREE ATRIOVENTRICULAR BLOCK TYPE 1.. 10/17/2013   • Congenital heart disease     WITH PULMONARY STENOSIS AND ATRIAL SEPTAL DEFECT REPAIR AS A CHILD. 01/25/16   • Mental retardation    • Pulmonary hypertension (CMS/HCC)     MODERATE ON LAST ECHOCARDIOGRAM ON OCTOBER 2014.   • Seizure disorder (CMS/HCC)        No Known Allergies    Past Surgical History:   Procedure Laterality Date   • ASD AND VSD REPAIR     • FOOT SURGERY      \"orthopedic surgery to straighten his feet\"   • HERNIA REPAIR         No family history on file.    Social History     Socioeconomic History   • Marital status: Single     Spouse name: Not on file "   • Number of children: Not on file   • Years of education: Not on file   • Highest education level: Not on file   Tobacco Use   • Smoking status: Never Smoker   • Smokeless tobacco: Never Used   Substance and Sexual Activity   • Alcohol use: No   • Drug use: No   • Sexual activity: Defer           Objective   Physical Exam  Vitals signs and nursing note reviewed.   Constitutional:       General: He is not in acute distress.     Appearance: He is well-developed. He is not diaphoretic.   HENT:      Head: Normocephalic and atraumatic.      Right Ear: External ear normal.      Left Ear: External ear normal.      Nose: Nose normal.   Eyes:      Conjunctiva/sclera: Conjunctivae normal.      Pupils: Pupils are equal, round, and reactive to light.   Neck:      Musculoskeletal: Normal range of motion and neck supple.      Vascular: No JVD.      Trachea: No tracheal deviation.   Cardiovascular:      Rate and Rhythm: Normal rate and regular rhythm.      Heart sounds: Normal heart sounds. No murmur.   Pulmonary:      Effort: Pulmonary effort is normal. No respiratory distress.      Breath sounds: Normal breath sounds. No wheezing.   Abdominal:      General: Bowel sounds are normal.      Palpations: Abdomen is soft.      Tenderness: There is no abdominal tenderness.   Musculoskeletal: Normal range of motion.         General: No deformity.   Skin:     General: Skin is warm and dry.      Coloration: Skin is not pale.      Findings: No erythema or rash.   Neurological:      Mental Status: He is alert and oriented to person, place, and time.      Cranial Nerves: No cranial nerve deficit.   Psychiatric:         Behavior: Behavior normal.         Thought Content: Thought content normal.         Procedures           ED Course  ED Course as of Mar 01 1652   Mon Mar 01, 2021   1401 Discussed CAT scan findings with father.  He is aware the need for follow-up.  Patient is followed by a primary care and [place].  We will give him the  number for pulmonology to follow-up on CAT scan.  Father does wish for patient to be treated with antibiotics.  Patient does have some gum disease as well.  He requires liquid antibiotics.  We will get blood cultures.  Is not felt that the patient has septic emboli.  He has low risk factors for this.  His white count is normal.    [JI]      ED Course User Index  [JI] Benton Copeland PA                                           MDM  Number of Diagnoses or Management Options  Acute bacterial bronchitis: new and requires workup  Pulmonary nodules: new and requires workup     Amount and/or Complexity of Data Reviewed  Clinical lab tests: reviewed and ordered  Tests in the radiology section of CPT®: reviewed and ordered  Independent visualization of images, tracings, or specimens: yes    Risk of Complications, Morbidity, and/or Mortality  Presenting problems: moderate        Final diagnoses:   Acute bacterial bronchitis   Pulmonary nodules            Benton Copeland PA  03/01/21 2529

## 2021-03-06 LAB
BACTERIA SPEC AEROBE CULT: NORMAL
BACTERIA SPEC AEROBE CULT: NORMAL

## 2021-04-01 ENCOUNTER — OFFICE VISIT (OUTPATIENT)
Dept: CARDIOLOGY | Facility: CLINIC | Age: 51
End: 2021-04-01

## 2021-04-01 VITALS
DIASTOLIC BLOOD PRESSURE: 82 MMHG | SYSTOLIC BLOOD PRESSURE: 139 MMHG | HEART RATE: 60 BPM | BODY MASS INDEX: 26.73 KG/M2 | OXYGEN SATURATION: 95 % | TEMPERATURE: 96 F | HEIGHT: 60 IN

## 2021-04-01 DIAGNOSIS — Q24.9 CONGENITAL HEART DISEASE: ICD-10-CM

## 2021-04-01 DIAGNOSIS — I27.20 MODERATE TO SEVERE PULMONARY HYPERTENSION (HCC): ICD-10-CM

## 2021-04-01 DIAGNOSIS — I48.3 TYPICAL ATRIAL FLUTTER (HCC): Primary | ICD-10-CM

## 2021-04-01 PROCEDURE — 93000 ELECTROCARDIOGRAM COMPLETE: CPT | Performed by: NURSE PRACTITIONER

## 2021-04-01 PROCEDURE — 99213 OFFICE O/P EST LOW 20 MIN: CPT | Performed by: NURSE PRACTITIONER

## 2021-04-01 NOTE — PROGRESS NOTES
Jeramy Lai MD  Trevor Navarro  1970 04/01/2021    Patient Active Problem List   Diagnosis   • Congenital heart disease with pulmonary stenosis and atrial septal defect repain as a child.    • Moderate to severe pulmonary hypertension (CMS/HCC)   • Atrial flutter, with CHADS-VASC score of 0, hence low stroke rish without anticoagulation.        Dear Jeramy Lai MD:    Subjective     Chief Complaint   Patient presents with   • Med Management     Verbal.    • Shortness of Breath           History of Present Illness:    Trevor Navarro is a 50 y.o. male with a past medical history of congenital heart disease with repair of ASD and pulmonic stenosis as a child, severe pulmonary hypertension, atrial flutter with controlled rate and CHADSVASc score of 0. He also has a history of Mobitz type I AV block which has been peristent and asymptomatic in the past. He does have a developmental delay and is accompanied by his father today.  His father reports recently he developed some shortness of breath and was evaluated in the emergency department.  proBNP was elevated around 1100 but CT of the chest did not reveal any ingestive changes.  The patient was diagnosed with bronchitis and given antibiotics.  Since finishing the antibiotics he is feeling great.  His father says he has had no further noticeable symptoms.  He notices his breathing has returned to normal.  His father also states he has had no edema, orthopnea, or weight gain.  He states he seems to feel comfortable and is in his usual state of health.  He does follow with a congenital heart specialist in [facility], Dr. Marques.  He states he was evaluated there couple of weeks ago.  Dr. Marques apparently reviewed the ER records and ordered an echocardiogram which was done in office.  The patient's father is not yet heard results from this.          No Known Allergies:      Current Outpatient Medications:   •  aspirin 81 MG EC tablet,  "Take 81 mg by mouth daily., Disp: , Rfl:   •  loratadine (CLARITIN) 5 MG/5ML syrup, Take  by mouth., Disp: , Rfl:   •  PHENobarbital (LUMINAL) 32.4 MG tablet, Take 16.2 mg by mouth 2 (two) times a day., Disp: , Rfl:       The following portions of the patient's history were reviewed and updated as appropriate: allergies, current medications, past family history, past medical history, past social history, past surgical history and problem list.    Social History     Tobacco Use   • Smoking status: Never Smoker   • Smokeless tobacco: Never Used   Substance Use Topics   • Alcohol use: No   • Drug use: No       Review of Systems   Unable to perform ROS: patient nonverbal       Objective   Vitals:    04/01/21 1010   BP: 139/82   BP Location: Right arm   Patient Position: Sitting   Cuff Size: Adult   Pulse: 60   Temp: 96 °F (35.6 °C)   TempSrc: Infrared   SpO2: 95%   Height: 139.7 cm (55\")     Body mass index is 26.73 kg/m².        Vitals reviewed.   Constitutional:       Appearance: Healthy appearance. Well-developed and not in distress.      Comments: Sitting in wheelchair   HENT:      Head: Normocephalic and atraumatic.   Pulmonary:      Effort: Pulmonary effort is normal.      Breath sounds: Normal breath sounds. No wheezing. No rales.   Cardiovascular:      Normal rate. Regular rhythm.      Murmurs: There is no murmur.      . No S3 and S4 gallop.   Edema:     Peripheral edema absent.   Abdominal:      General: Bowel sounds are normal.      Palpations: Abdomen is soft.   Skin:     General: Skin is warm and dry.   Neurological:      Mental Status: Alert and oriented to person, place, and time.      Comments: Patient non verbal   Psychiatric:         Mood and Affect: Mood normal.         Behavior: Behavior normal.         Lab Results   Component Value Date     (L) 03/01/2021    K 4.5 03/01/2021    CL 96 (L) 03/01/2021    CO2 26.1 03/01/2021    BUN 17 03/01/2021    CREATININE 1.02 03/01/2021    GLUCOSE 112 (H) " 03/01/2021    CALCIUM 9.7 03/01/2021    AST 17 03/01/2021    ALT 23 03/01/2021    ALKPHOS 91 03/01/2021     No results found for: CKTOTAL  Lab Results   Component Value Date    WBC 10.69 03/01/2021    HGB 15.4 03/01/2021    HCT 47.7 03/01/2021     03/01/2021              ECG 12 Lead    Date/Time: 4/1/2021 10:06 AM  Performed by: Madhavi Martins APRN  Authorized by: Madhavi Martins APRN   Comparison: compared with previous ECG   Similar to previous ECG  Rhythm: atrial flutter  BPM: 60                Assessment/Plan    Diagnosis Plan   1. Typical atrial flutter (CMS/HCC)  ECG 12 Lead   2. Congenital heart disease with pulmonary stenosis and atrial septal defect repair as a child.   ECG 12 Lead   3. Moderate to severe pulmonary hypertension (CMS/HCC)  ECG 12 Lead                Recommendations:    1. His heart rate is controlled with atrial flutter.  Therefore, we will continue to monitor.  He is not on any anticoagulation due to [id number] score of 0.  2. He does have known history of severe pulmonary hypertension but his family is declined further evaluation due to his inability to tolerate most diagnostic testing.  3. Since he appears to be euvolemic today, will not initiate diuretic therapy.  We will request records including recent echocardiogram from his cardiologist in [place].  4. Follow-up in 1 year or sooner if needed.        Return in about 1 year (around 4/1/2022) for Recheck.    As always, I appreciate very much the opportunity to participate in the cardiovascular care of your patients.      With Best Regards,    ANNABELLE Ambriz

## 2021-04-05 ENCOUNTER — IMMUNIZATION (OUTPATIENT)
Dept: VACCINE CLINIC | Facility: HOSPITAL | Age: 51
End: 2021-04-05

## 2021-04-05 PROCEDURE — 91300 HC SARSCOV02 VAC 30MCG/0.3ML IM: CPT | Performed by: INTERNAL MEDICINE

## 2021-04-05 PROCEDURE — 0001A: CPT | Performed by: INTERNAL MEDICINE

## 2021-04-22 ENCOUNTER — TELEPHONE (OUTPATIENT)
Dept: CARDIOLOGY | Facility: CLINIC | Age: 51
End: 2021-04-22

## 2021-04-26 ENCOUNTER — IMMUNIZATION (OUTPATIENT)
Dept: VACCINE CLINIC | Facility: HOSPITAL | Age: 51
End: 2021-04-26

## 2021-04-26 PROCEDURE — 91300 HC SARSCOV02 VAC 30MCG/0.3ML IM: CPT | Performed by: INTERNAL MEDICINE

## 2021-04-26 PROCEDURE — 0002A: CPT | Performed by: INTERNAL MEDICINE

## 2021-07-13 ENCOUNTER — OFFICE VISIT (OUTPATIENT)
Dept: PULMONOLOGY | Facility: CLINIC | Age: 51
End: 2021-07-13

## 2021-07-13 VITALS
DIASTOLIC BLOOD PRESSURE: 70 MMHG | BODY MASS INDEX: 26.26 KG/M2 | HEART RATE: 59 BPM | TEMPERATURE: 98.2 F | SYSTOLIC BLOOD PRESSURE: 110 MMHG | OXYGEN SATURATION: 97 % | WEIGHT: 113 LBS

## 2021-07-13 DIAGNOSIS — R91.8 MULTIPLE PULMONARY NODULES: Primary | ICD-10-CM

## 2021-07-13 PROCEDURE — 99213 OFFICE O/P EST LOW 20 MIN: CPT | Performed by: NURSE PRACTITIONER

## 2021-07-13 PROCEDURE — 93005 ELECTROCARDIOGRAM TRACING: CPT | Performed by: NURSE PRACTITIONER

## 2021-07-13 NOTE — PROGRESS NOTES
Chief Complaint  Abnormal CT chest       Subjective     {Problem List  Visit Diagnosis   Encounters  Notes  Medications  Labs  Result Review Imaging  Media :23}     Trevor Navarro presents to [facility] PULMONARY AND CRITICAL CARE MEDICINE  History of Present Illness     Mr. Navarro is a 51 year old male with a medical history significant for congenital heart disease, atrial flutter, mental retardation, seizure disorder and pulmonary hypertension.    He presents today for evaluation of an abnormal CT chest.  He underwent CT imaging in the emergency department which showed multiple parenchymal nodules.  His father accompanied him to today's appointment.  His reports that he has been doing well.  He denies any shortness of breath, cough, or congestion.  The patient is a non smoker.      Objective   Vital Signs:   /70 (BP Location: Left arm, Patient Position: Sitting)   Pulse 59   Temp 98.2 °F (36.8 °C)   Wt 51.3 kg (113 lb)   SpO2 97%   BMI 26.26 kg/m²     Physical Exam     GENERAL APPEARANCE:  well nourished, alert and cooperative, and appears to be in no acute distress.    HEAD: normocephalic. Atraumatic.    EYES: PERRL, EOMI. Vision is grossly intact.    THROAT: Oral cavity and pharynx normal. No inflammation, swelling, exudate, or lesions.     NECK: Neck supple.  No thyromegaly.    CARDIAC: Normal S1 and S2. No S3, S4 or murmurs. Rhythm is regular.     RESPIRATORY:Bilateral air entry positive. Bilateral diminished breath sounds. No wheezing, crackles or rhonchi noted.    GI: Positive bowel sounds. Soft, nondistended, nontender.     MUSCULOSKELETAL: muscular deformity noted. No edema. Peripheral pulses intact. No varicosities.    NEUROLOGICAL: Strength and sensation symmetric and intact throughout.     PSYCHIATRIC: unable to assess mental status as the patient is non verbal.    Result Review :   The following data was reviewed by: ANNABELLE Lagunas on  07/13/2021:  Common labs    Common Labsle 3/1/21 3/1/21    1114 1114   Glucose  112 (A)   BUN  17   Creatinine  1.02   eGFR Non African Am  78   Sodium  135 (A)   Potassium  4.5   Chloride  96 (A)   Calcium  9.7   Albumin  4.09   Total Bilirubin  0.3   Alkaline Phosphatase  91   AST (SGOT)  17   ALT (SGPT)  23   WBC 10.69    Hemoglobin 15.4    Hematocrit 47.7    Platelets 396    (A) Abnormal value            Data reviewed: CT imaging.          Assessment and Plan    Diagnoses and all orders for this visit:    1. Multiple pulmonary nodules (Primary)  -     CT Chest Without Contrast; Future          Abnormal CT chest:    CT imaging was reviewed.  Discussed with Dr. Lincoln.  We will obtain a CT Chest without contrast to reassess parenchymal nodules.      Patient's Body mass index is 26.26 kg/m². indicating that he is overweight (BMI 25-29.9). Obesity-related health conditions include the following: none. Obesity is unchanged. BMI is is above average; no BMI management plan is appropriate.         Follow Up   Return in about 6 weeks (around 8/24/2021).  Patient was given instructions and counseling regarding his condition or for health maintenance advice. Please see specific information pulled into the AVS if appropriate.

## 2021-08-03 ENCOUNTER — HOSPITAL ENCOUNTER (OUTPATIENT)
Dept: CT IMAGING | Facility: HOSPITAL | Age: 51
Discharge: HOME | End: 2021-08-03
Admitting: NURSE PRACTITIONER

## 2021-08-03 DIAGNOSIS — R91.8 MULTIPLE PULMONARY NODULES: ICD-10-CM

## 2021-08-03 PROCEDURE — 71250 CT THORAX DX C-: CPT

## 2021-08-03 PROCEDURE — 71250 CT THORAX DX C-: CPT | Performed by: RADIOLOGY

## 2021-08-05 ENCOUNTER — DOCUMENTATION (OUTPATIENT)
Dept: PULMONOLOGY | Facility: CLINIC | Age: 51
End: 2021-08-05

## 2021-10-21 ENCOUNTER — OFFICE VISIT (OUTPATIENT)
Dept: PULMONOLOGY | Facility: CLINIC | Age: 51
End: 2021-10-21

## 2021-10-21 VITALS
OXYGEN SATURATION: 95 % | HEIGHT: 60 IN | TEMPERATURE: 97.3 F | SYSTOLIC BLOOD PRESSURE: 102 MMHG | DIASTOLIC BLOOD PRESSURE: 76 MMHG | BODY MASS INDEX: 22.19 KG/M2 | WEIGHT: 113 LBS | HEART RATE: 58 BPM

## 2021-10-21 DIAGNOSIS — I27.20 MODERATE TO SEVERE PULMONARY HYPERTENSION (HCC): Primary | ICD-10-CM

## 2021-10-21 DIAGNOSIS — R91.1 PULMONARY NODULE: ICD-10-CM

## 2021-10-21 PROCEDURE — 99213 OFFICE O/P EST LOW 20 MIN: CPT | Performed by: NURSE PRACTITIONER

## 2021-10-21 NOTE — PROGRESS NOTES
"Chief Complaint  Lung Nodule    Subjective          Trevor Navarro presents to [facility] PULMONARY & CRITICAL CARE MEDICINE  History of Present Illness     Mr. Navarro is a 51-year-old male with a medical history significant for congenital heart disease, atrial flutter, pulmonary hypertension and previous lung nodule.    He presents today with his father for routine follow-up on lung nodule.  He underwent repeat CT imaging which showed resolution of lung nodule.  His father states that he has had no issues or shortness of breath since his last visit.    Objective   Vital Signs:   /76   Pulse 58   Temp 97.3 °F (36.3 °C) (Temporal)   Ht 139.7 cm (55\")   Wt 51.3 kg (113 lb)   SpO2 95%   BMI 26.26 kg/m²     Physical Exam     GENERAL APPEARANCE: Well developed, well nourished, alert and cooperative, and appears to be in no acute distress.    HEAD: normocephalic. Atraumatic.    EYES: PERRL, EOMI. Vision is grossly intact.    THROAT: Oral cavity and pharynx normal. No inflammation, swelling, exudate, or lesions.     NECK: Neck supple.  No thyromegaly.    CARDIAC: Normal S1 and S2. No S3, S4 or murmurs. Rhythm is regular.     RESPIRATORY:Bilateral air entry positive. Bilateral diminished breath sounds. No wheezing, crackles or rhonchi noted.    GI: Positive bowel sounds. Soft, nondistended, nontender.     MUSCULOSKELETAL: No significant deformity or joint abnormality. No edema. Peripheral pulses intact. No varicosities.    NEUROLOGICAL: Strength and sensation symmetric and intact throughout.     PSYCHIATRIC: The mental examination revealed the patient was oriented to person, place, and time.     Result Review :   The following data was reviewed by: ANNABELLE Lagunas on 10/21/2021:  Common labs    Common Labsle 3/1/21 3/1/21    1114 1114   Glucose  112 (A)   BUN  17   Creatinine  1.02   eGFR Non African Am  78   Sodium  135 (A)   Potassium  4.5   Chloride  96 (A)   Calcium  9.7 "   Albumin  4.09   Total Bilirubin  0.3   Alkaline Phosphatase  91   AST (SGOT)  17   ALT (SGPT)  23   WBC 10.69    Hemoglobin 15.4    Hematocrit 47.7    Platelets 396    (A) Abnormal value            Data reviewed: CT chest          Assessment and Plan    Diagnoses and all orders for this visit:    1. Moderate to severe pulmonary hypertension (HCC) (Primary)    2. Pulmonary nodule          Abnormal CT chest:    CT chest was completed and shows resolution of pulmonary nodule.  No further follow-up indicated.    Pulmonary hypertension:  Likely related to cardiac comorbidities.    Patient's Body mass index is 26.26 kg/m². indicating that he is overweight (BMI 25-29.9). Obesity-related health conditions include the following: none. Obesity is unchanged. BMI is is above average; no BMI management plan is appropriate. We discussed portion control, increasing exercise and the patient is non verbal and wheelchair bound...        Follow Up {Instructions Charge Capture  Follow-up Communications :23}  Return if symptoms worsen or fail to improve.  Patient was given instructions and counseling regarding his condition or for health maintenance advice. Please see specific information pulled into the AVS if appropriate.

## 2021-12-15 ENCOUNTER — HOSPITAL ENCOUNTER (EMERGENCY)
Facility: HOSPITAL | Age: 51
Discharge: HOME | End: 2021-12-15
Attending: EMERGENCY MEDICINE | Admitting: EMERGENCY MEDICINE

## 2021-12-15 ENCOUNTER — APPOINTMENT (OUTPATIENT)
Dept: CT IMAGING | Facility: HOSPITAL | Age: 51
End: 2021-12-15

## 2021-12-15 ENCOUNTER — APPOINTMENT (OUTPATIENT)
Dept: GENERAL RADIOLOGY | Facility: HOSPITAL | Age: 51
End: 2021-12-15

## 2021-12-15 VITALS
BODY MASS INDEX: 22.19 KG/M2 | HEART RATE: 60 BPM | RESPIRATION RATE: 18 BRPM | HEIGHT: 60 IN | WEIGHT: 113 LBS | TEMPERATURE: 97.9 F | SYSTOLIC BLOOD PRESSURE: 125 MMHG | DIASTOLIC BLOOD PRESSURE: 70 MMHG | OXYGEN SATURATION: 98 %

## 2021-12-15 DIAGNOSIS — M79.10 MYALGIA: Primary | ICD-10-CM

## 2021-12-15 LAB
ALBUMIN SERPL-MCNC: 4.63 G/DL (ref 3.5–5.2)
ALBUMIN/GLOB SERPL: 1.1 G/DL
ALP SERPL-CCNC: 120 U/L (ref 39–117)
ALT SERPL W P-5'-P-CCNC: 19 U/L (ref 1–41)
ANION GAP SERPL CALCULATED.3IONS-SCNC: 13.6 MMOL/L (ref 5–15)
AST SERPL-CCNC: 16 U/L (ref 1–40)
B PARAPERT DNA SPEC QL NAA+PROBE: NOT DETECTED
B PERT DNA SPEC QL NAA+PROBE: NOT DETECTED
BACTERIA UR QL AUTO: NORMAL /HPF
BASOPHILS # BLD AUTO: 0.05 10*3/MM3 (ref 0–0.2)
BASOPHILS NFR BLD AUTO: 0.4 % (ref 0–1.5)
BILIRUB SERPL-MCNC: 0.2 MG/DL (ref 0–1.2)
BILIRUB UR QL STRIP: NEGATIVE
BUN SERPL-MCNC: 18 MG/DL (ref 6–20)
BUN/CREAT SERPL: 17.6 (ref 7–25)
C PNEUM DNA NPH QL NAA+NON-PROBE: NOT DETECTED
CALCIUM SPEC-SCNC: 10.2 MG/DL (ref 8.6–10.5)
CHLORIDE SERPL-SCNC: 99 MMOL/L (ref 98–107)
CK SERPL-CCNC: 97 U/L (ref 20–200)
CLARITY UR: CLEAR
CO2 SERPL-SCNC: 27.4 MMOL/L (ref 22–29)
COLOR UR: YELLOW
CREAT SERPL-MCNC: 1.02 MG/DL (ref 0.76–1.27)
CRP SERPL-MCNC: 4.69 MG/DL (ref 0–0.5)
D-LACTATE SERPL-SCNC: 2.1 MMOL/L (ref 0.5–2)
D-LACTATE SERPL-SCNC: 2.6 MMOL/L (ref 0.5–2)
DEPRECATED RDW RBC AUTO: 46.6 FL (ref 37–54)
EOSINOPHIL # BLD AUTO: 0.05 10*3/MM3 (ref 0–0.4)
EOSINOPHIL NFR BLD AUTO: 0.4 % (ref 0.3–6.2)
ERYTHROCYTE [DISTWIDTH] IN BLOOD BY AUTOMATED COUNT: 13.6 % (ref 12.3–15.4)
ERYTHROCYTE [SEDIMENTATION RATE] IN BLOOD: 33 MM/HR (ref 0–15)
FLUAV SUBTYP SPEC NAA+PROBE: NOT DETECTED
FLUBV RNA ISLT QL NAA+PROBE: NOT DETECTED
GFR SERPL CREATININE-BSD FRML MDRD: 77 ML/MIN/1.73
GLOBULIN UR ELPH-MCNC: 4.2 GM/DL
GLUCOSE SERPL-MCNC: 127 MG/DL (ref 65–99)
GLUCOSE UR STRIP-MCNC: NEGATIVE MG/DL
HADV DNA SPEC NAA+PROBE: NOT DETECTED
HCOV 229E RNA SPEC QL NAA+PROBE: NOT DETECTED
HCOV HKU1 RNA SPEC QL NAA+PROBE: NOT DETECTED
HCOV NL63 RNA SPEC QL NAA+PROBE: NOT DETECTED
HCOV OC43 RNA SPEC QL NAA+PROBE: NOT DETECTED
HCT VFR BLD AUTO: 50.1 % (ref 37.5–51)
HGB BLD-MCNC: 15.9 G/DL (ref 13–17.7)
HGB UR QL STRIP.AUTO: NEGATIVE
HMPV RNA NPH QL NAA+NON-PROBE: NOT DETECTED
HOLD SPECIMEN: NORMAL
HOLD SPECIMEN: NORMAL
HPIV1 RNA ISLT QL NAA+PROBE: NOT DETECTED
HPIV2 RNA SPEC QL NAA+PROBE: NOT DETECTED
HPIV3 RNA NPH QL NAA+PROBE: NOT DETECTED
HPIV4 P GENE NPH QL NAA+PROBE: NOT DETECTED
HYALINE CASTS UR QL AUTO: NORMAL /LPF
IMM GRANULOCYTES # BLD AUTO: 0.05 10*3/MM3 (ref 0–0.05)
IMM GRANULOCYTES NFR BLD AUTO: 0.4 % (ref 0–0.5)
KETONES UR QL STRIP: NEGATIVE
LEUKOCYTE ESTERASE UR QL STRIP.AUTO: NEGATIVE
LIPASE SERPL-CCNC: 27 U/L (ref 13–60)
LYMPHOCYTES # BLD AUTO: 2.2 10*3/MM3 (ref 0.7–3.1)
LYMPHOCYTES NFR BLD AUTO: 18.4 % (ref 19.6–45.3)
M PNEUMO IGG SER IA-ACNC: NOT DETECTED
MCH RBC QN AUTO: 29.7 PG (ref 26.6–33)
MCHC RBC AUTO-ENTMCNC: 31.7 G/DL (ref 31.5–35.7)
MCV RBC AUTO: 93.6 FL (ref 79–97)
MONOCYTES # BLD AUTO: 1.15 10*3/MM3 (ref 0.1–0.9)
MONOCYTES NFR BLD AUTO: 9.6 % (ref 5–12)
MYOGLOBIN SERPL-MCNC: <21 NG/ML (ref 28–72)
NEUTROPHILS NFR BLD AUTO: 70.8 % (ref 42.7–76)
NEUTROPHILS NFR BLD AUTO: 8.47 10*3/MM3 (ref 1.7–7)
NITRITE UR QL STRIP: NEGATIVE
NRBC BLD AUTO-RTO: 0 /100 WBC (ref 0–0.2)
PH UR STRIP.AUTO: 5.5 [PH] (ref 5–8)
PHENOBARB SERPL-MCNC: 15.3 MCG/ML (ref 10–30)
PLATELET # BLD AUTO: 329 10*3/MM3 (ref 140–450)
PMV BLD AUTO: 9.7 FL (ref 6–12)
POTASSIUM SERPL-SCNC: 4.9 MMOL/L (ref 3.5–5.2)
PROT SERPL-MCNC: 8.8 G/DL (ref 6–8.5)
PROT UR QL STRIP: ABNORMAL
QT INTERVAL: 372 MS
QTC INTERVAL: 368 MS
RBC # BLD AUTO: 5.35 10*6/MM3 (ref 4.14–5.8)
RBC # UR STRIP: NORMAL /HPF
REF LAB TEST METHOD: NORMAL
RHINOVIRUS RNA SPEC NAA+PROBE: NOT DETECTED
RSV RNA NPH QL NAA+NON-PROBE: NOT DETECTED
S PYO AG THROAT QL: NEGATIVE
SARS-COV-2 RNA NPH QL NAA+NON-PROBE: NOT DETECTED
SODIUM SERPL-SCNC: 140 MMOL/L (ref 136–145)
SP GR UR STRIP: 1.01 (ref 1–1.03)
SQUAMOUS #/AREA URNS HPF: NORMAL /HPF
TROPONIN T SERPL-MCNC: <0.01 NG/ML (ref 0–0.03)
TSH SERPL DL<=0.05 MIU/L-ACNC: 1.27 UIU/ML (ref 0.27–4.2)
UROBILINOGEN UR QL STRIP: ABNORMAL
WBC # UR STRIP: NORMAL /HPF
WBC NRBC COR # BLD: 11.97 10*3/MM3 (ref 3.4–10.8)
WHOLE BLOOD HOLD SPECIMEN: NORMAL
WHOLE BLOOD HOLD SPECIMEN: NORMAL

## 2021-12-15 PROCEDURE — 93005 ELECTROCARDIOGRAM TRACING: CPT | Performed by: PHYSICIAN ASSISTANT

## 2021-12-15 PROCEDURE — 82550 ASSAY OF CK (CPK): CPT | Performed by: PHYSICIAN ASSISTANT

## 2021-12-15 PROCEDURE — 36415 COLL VENOUS BLD VENIPUNCTURE: CPT

## 2021-12-15 PROCEDURE — 99283 EMERGENCY DEPT VISIT LOW MDM: CPT

## 2021-12-15 PROCEDURE — 71045 X-RAY EXAM CHEST 1 VIEW: CPT | Performed by: RADIOLOGY

## 2021-12-15 PROCEDURE — 70491 CT SOFT TISSUE NECK W/DYE: CPT | Performed by: RADIOLOGY

## 2021-12-15 PROCEDURE — 71260 CT THORAX DX C+: CPT

## 2021-12-15 PROCEDURE — 86140 C-REACTIVE PROTEIN: CPT | Performed by: PHYSICIAN ASSISTANT

## 2021-12-15 PROCEDURE — 87040 BLOOD CULTURE FOR BACTERIA: CPT | Performed by: PHYSICIAN ASSISTANT

## 2021-12-15 PROCEDURE — 87880 STREP A ASSAY W/OPTIC: CPT | Performed by: PHYSICIAN ASSISTANT

## 2021-12-15 PROCEDURE — 93010 ELECTROCARDIOGRAM REPORT: CPT | Performed by: INTERNAL MEDICINE

## 2021-12-15 PROCEDURE — P9612 CATHETERIZE FOR URINE SPEC: HCPCS

## 2021-12-15 PROCEDURE — 71045 X-RAY EXAM CHEST 1 VIEW: CPT

## 2021-12-15 PROCEDURE — 84443 ASSAY THYROID STIM HORMONE: CPT | Performed by: PHYSICIAN ASSISTANT

## 2021-12-15 PROCEDURE — 83690 ASSAY OF LIPASE: CPT | Performed by: PHYSICIAN ASSISTANT

## 2021-12-15 PROCEDURE — 84484 ASSAY OF TROPONIN QUANT: CPT | Performed by: PHYSICIAN ASSISTANT

## 2021-12-15 PROCEDURE — 74177 CT ABD & PELVIS W/CONTRAST: CPT | Performed by: RADIOLOGY

## 2021-12-15 PROCEDURE — 74177 CT ABD & PELVIS W/CONTRAST: CPT

## 2021-12-15 PROCEDURE — 25010000002 IOPAMIDOL 61 % SOLUTION: Performed by: EMERGENCY MEDICINE

## 2021-12-15 PROCEDURE — 83605 ASSAY OF LACTIC ACID: CPT | Performed by: PHYSICIAN ASSISTANT

## 2021-12-15 PROCEDURE — 85652 RBC SED RATE AUTOMATED: CPT | Performed by: PHYSICIAN ASSISTANT

## 2021-12-15 PROCEDURE — 83874 ASSAY OF MYOGLOBIN: CPT | Performed by: PHYSICIAN ASSISTANT

## 2021-12-15 PROCEDURE — 85025 COMPLETE CBC W/AUTO DIFF WBC: CPT | Performed by: PHYSICIAN ASSISTANT

## 2021-12-15 PROCEDURE — 80053 COMPREHEN METABOLIC PANEL: CPT | Performed by: PHYSICIAN ASSISTANT

## 2021-12-15 PROCEDURE — 80184 ASSAY OF PHENOBARBITAL: CPT | Performed by: PHYSICIAN ASSISTANT

## 2021-12-15 PROCEDURE — 70491 CT SOFT TISSUE NECK W/DYE: CPT

## 2021-12-15 PROCEDURE — 81001 URINALYSIS AUTO W/SCOPE: CPT | Performed by: PHYSICIAN ASSISTANT

## 2021-12-15 PROCEDURE — 71260 CT THORAX DX C+: CPT | Performed by: RADIOLOGY

## 2021-12-15 PROCEDURE — 87081 CULTURE SCREEN ONLY: CPT | Performed by: PHYSICIAN ASSISTANT

## 2021-12-15 PROCEDURE — 0202U NFCT DS 22 TRGT SARS-COV-2: CPT | Performed by: PHYSICIAN ASSISTANT

## 2021-12-15 PROCEDURE — 51798 US URINE CAPACITY MEASURE: CPT

## 2021-12-15 RX ORDER — SODIUM CHLORIDE 0.9 % (FLUSH) 0.9 %
10 SYRINGE (ML) INJECTION AS NEEDED
Status: DISCONTINUED | OUTPATIENT
Start: 2021-12-15 | End: 2021-12-15 | Stop reason: HOSPADM

## 2021-12-15 RX ORDER — ACETAMINOPHEN 160 MG/5ML
650 SOLUTION ORAL ONCE
Status: COMPLETED | OUTPATIENT
Start: 2021-12-15 | End: 2021-12-15

## 2021-12-15 RX ADMIN — ACETAMINOPHEN ORAL SOLUTION 650 MG: 650 SOLUTION ORAL at 13:56

## 2021-12-15 RX ADMIN — IOPAMIDOL 89 ML: 612 INJECTION, SOLUTION INTRAVENOUS at 12:15

## 2021-12-15 RX ADMIN — SODIUM CHLORIDE 500 ML: 9 INJECTION, SOLUTION INTRAVENOUS at 10:39

## 2021-12-15 NOTE — ED NOTES
MEDICAL SCREENING:    Reason for Visit: Feeling poorly with decreased appetite/intake    Patient initially seen in triage.  The patient was advised further evaluation and diagnostic testing will be needed, some of the treatment and testing will be initiated in the lobby in order to begin the process.  The patient will be returned to the waiting area for the time being and possibly be re-assessed by a subsequent ED provider.  The patient will be brought back to the treatment area in as timely manner as possible.         Benton Copeland PA  12/15/21 0830

## 2021-12-15 NOTE — ED PROVIDER NOTES
"Subjective   51-year-old white male presents with his father due to change of his usual demeanor along with apparent generalized pain.  Patient has MR.  He is not able to convey his history.  He has not had a fever.  According to father he has not had nausea vomiting or diarrhea.  He has had 2 recent bowel movements.  He is not been eating like usual and sometimes seems to be having difficulty swallowing.  Patient was recently placed on Lipitor.  He takes phenobarbital.  No cough or congestion.  No shortness of breath.          Review of Systems   Constitutional: Negative.  Negative for fever.   HENT: Negative.    Respiratory: Negative.    Cardiovascular: Negative.  Negative for chest pain.   Gastrointestinal: Negative.  Negative for abdominal pain.   Endocrine: Negative.    Genitourinary: Negative.  Negative for dysuria.   Skin: Negative.    Neurological: Negative.    Psychiatric/Behavioral: Negative.    All other systems reviewed and are negative.      Past Medical History:   Diagnosis Date   • Aortic insufficiency     MILD REGURGITAION, AND MILD TRICUSPID REGURGITAION.   • ASD (atrial septal defect)    • Atrial flutter (HCC)     NEW ONSET WTIH CHADS-VASC SCORE OF 0, HENCE LOW STROKE RISK WITHOUT ANTICOAGULATION.   • AV block, Mobitz 1    • Bradycardia     WITH A SECOND DEGREE ATRIOVENTRICULAR BLOCK TYPE 1.. 10/17/2013   • Congenital heart disease     WITH PULMONARY STENOSIS AND ATRIAL SEPTAL DEFECT REPAIR AS A CHILD. 01/25/16   • Mental retardation    • Pulmonary hypertension (HCC)     MODERATE ON LAST ECHOCARDIOGRAM ON OCTOBER 2014.   • Seizure disorder (HCC)        No Known Allergies    Past Surgical History:   Procedure Laterality Date   • ASD AND VSD REPAIR     • FOOT SURGERY      \"orthopedic surgery to straighten his feet\"   • HERNIA REPAIR         Family History   Problem Relation Age of Onset   • Cancer Paternal Aunt    • Cancer Paternal Uncle        Social History     Socioeconomic History   • Marital " status: Single   Tobacco Use   • Smoking status: Never Smoker   • Smokeless tobacco: Never Used   Vaping Use   • Vaping Use: Never used   Substance and Sexual Activity   • Alcohol use: No   • Drug use: No   • Sexual activity: Defer           Objective   Physical Exam  Vitals and nursing note reviewed.   Constitutional:       General: He is not in acute distress.     Appearance: He is well-developed. He is not diaphoretic.      Comments: Very pleasant.  Not able to provide history.  Father provides all history.   HENT:      Head: Normocephalic and atraumatic.      Right Ear: External ear normal.      Left Ear: External ear normal.      Nose: Nose normal.   Eyes:      Conjunctiva/sclera: Conjunctivae normal.      Pupils: Pupils are equal, round, and reactive to light.   Neck:      Vascular: No JVD.      Trachea: No tracheal deviation.   Cardiovascular:      Rate and Rhythm: Normal rate and regular rhythm.      Heart sounds: Normal heart sounds. No murmur heard.      Pulmonary:      Effort: Pulmonary effort is normal. No respiratory distress.      Breath sounds: Normal breath sounds. No wheezing.   Abdominal:      General: Bowel sounds are normal.      Palpations: Abdomen is soft.      Tenderness: There is no abdominal tenderness.   Musculoskeletal:         General: No deformity. Normal range of motion.      Cervical back: Normal range of motion and neck supple.   Skin:     General: Skin is warm and dry.      Coloration: Skin is not pale.      Findings: No erythema or rash.   Neurological:      Mental Status: He is alert.      Cranial Nerves: No cranial nerve deficit.   Psychiatric:         Behavior: Behavior normal.         Thought Content: Thought content normal.         Procedures           ED Course  ED Course as of 12/15/21 1933   Wed Dec 15, 2021   1048 ECG 12 Lead  Atrial flutter with variable AV block  Left axis deviation  Nonspecific ST and T wave abnormality  Abnormal ECG  No previous ECGs available  Vent.  rate 74 BPM  AK interval * ms  QRS duration 104 ms  QT/QTcB 370/410 ms  P-R-T axes -62 -30 93 [ES]      ED Course User Index  [ES] Marcus Cheng MD                                                 MDM  Number of Diagnoses or Management Options  Myalgia: new and requires workup     Amount and/or Complexity of Data Reviewed  Clinical lab tests: reviewed and ordered  Tests in the radiology section of CPT®: reviewed and ordered  Tests in the medicine section of CPT®: reviewed and ordered  Independent visualization of images, tracings, or specimens: yes        Final diagnoses:   Myalgia       ED Disposition  ED Disposition     ED Disposition Condition Comment    Discharge Stable           Jeramy Lai MD  [address] [facility] [address] [place] [id number] [phone]    Schedule an appointment as soon as possible for a visit            Medication List      No changes were made to your prescriptions during this visit.          Benton Copeland, PA  12/15/21 1933

## 2021-12-17 LAB — BACTERIA SPEC AEROBE CULT: NO GROWTH

## 2021-12-20 LAB
BACTERIA SPEC AEROBE CULT: NORMAL
BACTERIA SPEC AEROBE CULT: NORMAL

## 2022-01-26 ENCOUNTER — OFFICE VISIT (OUTPATIENT)
Dept: PULMONOLOGY | Facility: CLINIC | Age: 52
End: 2022-01-26

## 2022-01-26 VITALS
BODY MASS INDEX: 22.19 KG/M2 | DIASTOLIC BLOOD PRESSURE: 82 MMHG | TEMPERATURE: 97.5 F | WEIGHT: 113 LBS | HEIGHT: 60 IN | SYSTOLIC BLOOD PRESSURE: 115 MMHG | OXYGEN SATURATION: 92 % | HEART RATE: 61 BPM

## 2022-01-26 DIAGNOSIS — I27.20 MODERATE TO SEVERE PULMONARY HYPERTENSION: ICD-10-CM

## 2022-01-26 DIAGNOSIS — R06.02 SHORTNESS OF BREATH: Primary | ICD-10-CM

## 2022-01-26 PROCEDURE — 99213 OFFICE O/P EST LOW 20 MIN: CPT | Performed by: NURSE PRACTITIONER

## 2022-01-26 NOTE — PROGRESS NOTES
"Chief Complaint  Shortness of Breath    Subjective          Trevor Navarro presents to [facility] PULMONARY & CRITICAL CARE MEDICINE  History of Present Illness       Mr. Navarro is a 51-year-old male for congenital heart disease, atrial flutter, MR, seizure disorder and pulmonary hypertension.    He presents today for a follow-up on shortness of breath.  His father accompanied him to his appointment today.  Patient is nonverbal so his father provides history of present illness.  He tells me that he has recently been sick and at this time his oxygen was low on occasion.  He reports that over the last couple of weeks the patient seems to have been feeling better and shortness of breath seems to have improved.  He was initially seen at our office for a pulmonary nodule that has now resolved.       Objective   Vital Signs:   /82   Pulse 61   Temp 97.5 °F (36.4 °C) (Temporal)   Ht 149.9 cm (59\")   Wt 51.3 kg (113 lb)   SpO2 92%   BMI 22.82 kg/m²     Physical Exam     GENERAL APPEARANCE: chronically ill appearing, well nourished, alert and cooperative, and appears to be in no acute distress.    HEAD: normocephalic. Atraumatic.    EYES: PERRL, EOMI. Vision is grossly intact.    THROAT: Oral cavity and pharynx normal. No inflammation, swelling, exudate, or lesions.     NECK: Neck supple.  No thyromegaly.    CARDIAC: Normal S1 and S2. No S3, S4 or murmurs. Rhythm is regular.     RESPIRATORY:Bilateral air entry positive. Bilateral diminished breath sounds. No wheezing, crackles or rhonchi noted.    GI: Positive bowel sounds. Soft, nondistended, nontender.     MUSCULOSKELETAL: No significant deformity or joint abnormality. No edema. Peripheral pulses intact. No varicosities.    NEUROLOGICAL: Strength and sensation symmetric and intact throughout.     PSYCHIATRIC: The mental examination revealed the patient was oriented to person, place, and time.     Result Review :   The following data was " reviewed by: ANNABELLE Lagunas on 01/26/2022:  Common labs    Common Labsle 3/1/21 3/1/21 12/15/21 12/15/21    1114 1114 0916 0916   Glucose  112 (A)  127 (A)   BUN  17  18   Creatinine  1.02  1.02   eGFR Non African Am  78  77   Sodium  135 (A)  140   Potassium  4.5  4.9   Chloride  96 (A)  99   Calcium  9.7  10.2   Albumin  4.09  4.63   Total Bilirubin  0.3  0.2   Alkaline Phosphatase  91  120 (A)   AST (SGOT)  17  16   ALT (SGPT)  23  19   WBC 10.69  11.97 (A)    Hemoglobin 15.4  15.9    Hematocrit 47.7  50.1    Platelets 396  329    (A) Abnormal value                     Assessment and Plan    Diagnoses and all orders for this visit:    1. Shortness of breath (Primary)  -     Overnight Sleep Oximetry Study; Future    2. Moderate to severe pulmonary hypertension (HCC)  -     Overnight Sleep Oximetry Study; Future          Pulmonary hypertension:    Likely due to cardiac commorbidities.    Due to intermittent hypoxia we will obtain an overnight pulse oximetry study.  I do believe that hypoxia was related to acute illness.    Patient's Body mass index is 22.82 kg/m². indicating that he is within normal range (BMI 18.5-24.9). No BMI management plan needed.      Follow Up   Return in about 3 months (around 4/26/2022), or if symptoms worsen or fail to improve.  Patient was given instructions and counseling regarding his condition or for health maintenance advice. Please see specific information pulled into the AVS if appropriate.

## 2022-03-03 ENCOUNTER — TRANSCRIBE ORDERS (OUTPATIENT)
Dept: ADMINISTRATIVE | Facility: HOSPITAL | Age: 52
End: 2022-03-03

## 2022-03-03 ENCOUNTER — LAB (OUTPATIENT)
Dept: LAB | Facility: HOSPITAL | Age: 52
End: 2022-03-03

## 2022-03-03 DIAGNOSIS — E78.2 MIXED HYPERLIPIDEMIA: ICD-10-CM

## 2022-03-03 DIAGNOSIS — G40.309 BENIGN NEONATAL CONVULSIONS: Primary | ICD-10-CM

## 2022-03-03 DIAGNOSIS — G40.309 BENIGN NEONATAL CONVULSIONS: ICD-10-CM

## 2022-03-03 LAB
ALBUMIN SERPL-MCNC: 4.6 G/DL (ref 3.5–5.2)
ALBUMIN/GLOB SERPL: 1.3 G/DL
ALP SERPL-CCNC: 83 U/L (ref 39–117)
ALT SERPL W P-5'-P-CCNC: 21 U/L (ref 1–41)
ANION GAP SERPL CALCULATED.3IONS-SCNC: 13 MMOL/L (ref 5–15)
AST SERPL-CCNC: 19 U/L (ref 1–40)
BASOPHILS # BLD AUTO: 0.05 10*3/MM3 (ref 0–0.2)
BASOPHILS NFR BLD AUTO: 0.6 % (ref 0–1.5)
BILIRUB SERPL-MCNC: 0.2 MG/DL (ref 0–1.2)
BUN SERPL-MCNC: 16 MG/DL (ref 6–20)
BUN/CREAT SERPL: 16.7 (ref 7–25)
CALCIUM SPEC-SCNC: 9.2 MG/DL (ref 8.6–10.5)
CHLORIDE SERPL-SCNC: 98 MMOL/L (ref 98–107)
CHOLEST SERPL-MCNC: 355 MG/DL (ref 0–200)
CO2 SERPL-SCNC: 27 MMOL/L (ref 22–29)
CREAT SERPL-MCNC: 0.96 MG/DL (ref 0.76–1.27)
DEPRECATED RDW RBC AUTO: 42.6 FL (ref 37–54)
EGFRCR SERPLBLD CKD-EPI 2021: 96.3 ML/MIN/1.73
EOSINOPHIL # BLD AUTO: 0.09 10*3/MM3 (ref 0–0.4)
EOSINOPHIL NFR BLD AUTO: 1.1 % (ref 0.3–6.2)
ERYTHROCYTE [DISTWIDTH] IN BLOOD BY AUTOMATED COUNT: 13.1 % (ref 12.3–15.4)
GLOBULIN UR ELPH-MCNC: 3.5 GM/DL
GLUCOSE SERPL-MCNC: 113 MG/DL (ref 65–99)
HBA1C MFR BLD: 6.5 % (ref 4.8–5.6)
HCT VFR BLD AUTO: 46.4 % (ref 37.5–51)
HDLC SERPL-MCNC: 45 MG/DL (ref 40–60)
HGB BLD-MCNC: 15.4 G/DL (ref 13–17.7)
IMM GRANULOCYTES # BLD AUTO: 0.03 10*3/MM3 (ref 0–0.05)
IMM GRANULOCYTES NFR BLD AUTO: 0.4 % (ref 0–0.5)
LDLC SERPL CALC-MCNC: 240 MG/DL (ref 0–100)
LDLC/HDLC SERPL: 5.44 {RATIO}
LYMPHOCYTES # BLD AUTO: 2.82 10*3/MM3 (ref 0.7–3.1)
LYMPHOCYTES NFR BLD AUTO: 33.9 % (ref 19.6–45.3)
MCH RBC QN AUTO: 29.5 PG (ref 26.6–33)
MCHC RBC AUTO-ENTMCNC: 33.2 G/DL (ref 31.5–35.7)
MCV RBC AUTO: 88.9 FL (ref 79–97)
MONOCYTES # BLD AUTO: 0.73 10*3/MM3 (ref 0.1–0.9)
MONOCYTES NFR BLD AUTO: 8.8 % (ref 5–12)
NEUTROPHILS NFR BLD AUTO: 4.6 10*3/MM3 (ref 1.7–7)
NEUTROPHILS NFR BLD AUTO: 55.2 % (ref 42.7–76)
NRBC BLD AUTO-RTO: 0.1 /100 WBC (ref 0–0.2)
PHENOBARB SERPL-MCNC: 13.5 MCG/ML (ref 10–30)
PLATELET # BLD AUTO: 322 10*3/MM3 (ref 140–450)
PMV BLD AUTO: 10.4 FL (ref 6–12)
POTASSIUM SERPL-SCNC: 4.4 MMOL/L (ref 3.5–5.2)
PROT SERPL-MCNC: 8.1 G/DL (ref 6–8.5)
RBC # BLD AUTO: 5.22 10*6/MM3 (ref 4.14–5.8)
SODIUM SERPL-SCNC: 138 MMOL/L (ref 136–145)
TRIGL SERPL-MCNC: 327 MG/DL (ref 0–150)
VLDLC SERPL-MCNC: 70 MG/DL (ref 5–40)
WBC NRBC COR # BLD: 8.32 10*3/MM3 (ref 3.4–10.8)

## 2022-03-03 PROCEDURE — 83036 HEMOGLOBIN GLYCOSYLATED A1C: CPT

## 2022-03-03 PROCEDURE — 80061 LIPID PANEL: CPT

## 2022-03-03 PROCEDURE — 80053 COMPREHEN METABOLIC PANEL: CPT

## 2022-03-03 PROCEDURE — 36415 COLL VENOUS BLD VENIPUNCTURE: CPT

## 2022-03-03 PROCEDURE — 85025 COMPLETE CBC W/AUTO DIFF WBC: CPT

## 2022-03-03 PROCEDURE — 80184 ASSAY OF PHENOBARBITAL: CPT

## 2022-04-01 ENCOUNTER — OFFICE VISIT (OUTPATIENT)
Dept: CARDIOLOGY | Facility: CLINIC | Age: 52
End: 2022-04-01

## 2022-04-01 VITALS
DIASTOLIC BLOOD PRESSURE: 82 MMHG | TEMPERATURE: 96.9 F | WEIGHT: 114.2 LBS | BODY MASS INDEX: 22.42 KG/M2 | SYSTOLIC BLOOD PRESSURE: 147 MMHG | HEART RATE: 58 BPM | HEIGHT: 60 IN

## 2022-04-01 DIAGNOSIS — I48.3 TYPICAL ATRIAL FLUTTER: Primary | ICD-10-CM

## 2022-04-01 DIAGNOSIS — E78.5 DYSLIPIDEMIA: ICD-10-CM

## 2022-04-01 DIAGNOSIS — I27.20 MODERATE TO SEVERE PULMONARY HYPERTENSION: ICD-10-CM

## 2022-04-01 PROCEDURE — 99213 OFFICE O/P EST LOW 20 MIN: CPT | Performed by: NURSE PRACTITIONER

## 2022-04-01 NOTE — PROGRESS NOTES
Jeramy Lai MD  Trevor Navarro  1970 04/01/2022    Patient Active Problem List   Diagnosis   • Congenital heart disease with pulmonary stenosis and atrial septal defect repain as a child.    • Moderate to severe pulmonary hypertension (HCC)   • Atrial flutter, with CHADS-VASC score of 0, hence low stroke rish without anticoagulation.        Dear Jeramy Lai MD:    Subjective     Chief Complaint   Patient presents with   • Follow-up     ROUTINE           History of Present Illness:    Trevor Navarro is a 51 y.o. male with a past medical history of congenital heart disease with repair of ASD and pulmonic stenosis as a child, pulmonary hypertension, and atrial flutter with controlled ventricular rate and CHADSVASc score of 0.  He presents today for cardiology follow-up.  He does have a developmental delay and his father is present with him today.  His father reports he has been doing very well.  Denies any recent complaints by the patient such as chest pains or shortness of breath.  He also follows with his cardiologist in [place], [place]-Dr. Marques.  He recently had annual evaluation no changes were made.  Patient's father does report an echocardiogram will be performed next year.  His echocardiogram from 2021 has been reviewed and revealed normal LV function with moderate pulmonary hypertension.  Of note, patient does have dyslipidemia and his primary care provider did try 2 different statins.  However, he had severe myalgias and had to go to the ER for treatment in December.  Therefore, the patient is no longer taking statin.  His father does not think he would tolerate an injection.          No Known Allergies:      Current Outpatient Medications:   •  aspirin 81 MG EC tablet, Take 81 mg by mouth daily., Disp: , Rfl:   •  loratadine (CLARITIN) 5 MG/5ML syrup, Take  by mouth., Disp: , Rfl:   •  PHENobarbital (LUMINAL) 32.4 MG tablet, Take 16.2 mg by mouth 2 (two) times a day., Disp:  ", Rfl:       The following portions of the patient's history were reviewed and updated as appropriate: allergies, current medications, past family history, past medical history, past social history, past surgical history and problem list.    Social History     Tobacco Use   • Smoking status: Never Smoker   • Smokeless tobacco: Never Used   Vaping Use   • Vaping Use: Never used   Substance Use Topics   • Alcohol use: No   • Drug use: No       ROS    Objective   Vitals:    04/01/22 1027   BP: 147/82   Pulse: 58   Temp: 96.9 °F (36.1 °C)   Weight: 51.8 kg (114 lb 3.2 oz)   Height: 149.9 cm (59\")     Body mass index is 23.07 kg/m².        Vitals reviewed.   Constitutional:       Appearance: Healthy appearance. Well-developed and not in distress.   HENT:      Head: Normocephalic and atraumatic.   Pulmonary:      Effort: Pulmonary effort is normal.      Breath sounds: Normal breath sounds. No wheezing. No rales.   Cardiovascular:      Normal rate. Regular rhythm.      Murmurs: There is a diastolic murmur.      . No S3 and S4 gallop.   Edema:     Peripheral edema absent.   Abdominal:      General: Bowel sounds are normal.      Palpations: Abdomen is soft.   Skin:     General: Skin is warm and dry.   Neurological:      Mental Status: Alert and oriented to person, place, and time.      Comments: Non verbal   Psychiatric:         Mood and Affect: Mood normal.         Behavior: Behavior normal.         Lab Results   Component Value Date     03/03/2022    K 4.4 03/03/2022    CL 98 03/03/2022    CO2 27.0 03/03/2022    BUN 16 03/03/2022    CREATININE 0.96 03/03/2022    GLUCOSE 113 (H) 03/03/2022    CALCIUM 9.2 03/03/2022    AST 19 03/03/2022    ALT 21 03/03/2022    ALKPHOS 83 03/03/2022     Lab Results   Component Value Date    CKTOTAL 97 12/15/2021     Lab Results   Component Value Date    WBC 8.32 03/03/2022    HGB 15.4 03/03/2022    HCT 46.4 03/03/2022     03/03/2022     No results found for: INR  No results found " for: MG  Lab Results   Component Value Date    TSH 1.270 12/15/2021    TRIG 327 (H) 03/03/2022    HDL 45 03/03/2022     (H) 03/03/2022      No results found for: BNP        Procedures      Assessment/Plan    Diagnosis Plan   1. Typical atrial flutter (HCC)     2. Moderate to severe pulmonary hypertension (HCC)     3. Dyslipidemia                  Recommendations:    1. Typical atrial flutter-rate controlled.  CHADSVASc score is 0, on low-dose aspirin.  2. Moderate to severe pulmonary hypertension-stable on echocardiogram in March 2021.  Following with congenital heart specialist in [place], [place].  3. Dyslipidemia-patient cannot tolerate statins.  Father declines PCSK9 inhibitor therapy.  We did discuss dietary changes.   4. Follow-up in 1 year or sooner if needed.        Return in about 1 year (around 4/1/2023) for Recheck.    As always, I appreciate very much the opportunity to participate in the cardiovascular care of your patients.      With Best Regards,    ANNABELLE Ambriz

## 2022-04-28 ENCOUNTER — OFFICE VISIT (OUTPATIENT)
Dept: PULMONOLOGY | Facility: CLINIC | Age: 52
End: 2022-04-28

## 2022-04-28 VITALS
TEMPERATURE: 97.3 F | SYSTOLIC BLOOD PRESSURE: 104 MMHG | OXYGEN SATURATION: 97 % | HEART RATE: 58 BPM | DIASTOLIC BLOOD PRESSURE: 82 MMHG

## 2022-04-28 DIAGNOSIS — I27.20 MODERATE TO SEVERE PULMONARY HYPERTENSION: Primary | ICD-10-CM

## 2022-04-28 PROCEDURE — 99213 OFFICE O/P EST LOW 20 MIN: CPT | Performed by: NURSE PRACTITIONER

## 2022-04-28 NOTE — PROGRESS NOTES
Chief Complaint  Shortness of Breath (Follow up)    Subjective     {Problem List  Visit Diagnosis   Encounters  Notes  Medications  Labs  Result Review Imaging  Media :23}     Trevor Navarro presents to [facility]  History of Present Illness     Mr. Navarro is a 51 year old male with a medical history significant for aortic insufficiency, atrial flutter, ASD, MR, seizure disorder and pulmonary hypertension.    He presents today for routine follow up on shortness of breath.  His dad accompanies him to his visit today.  His dad reports that he has been doing well.  He has had no shortness of breath. He reports that his oxygen levels have also stayed good.     Objective   Vital Signs:   /82 (BP Location: Left arm, Patient Position: Sitting, Cuff Size: Adult)   Pulse 58   Temp 97.3 °F (36.3 °C) (Temporal)   SpO2 97%     Physical Exam       GENERAL APPEARANCE: Well developed, well nourished, alert and cooperative, and appears to be in no acute distress.    HEAD: normocephalic. Atraumatic.    EYES: PERRL, EOMI. Vision is grossly intact.    THROAT: Oral cavity and pharynx normal. No inflammation, swelling, exudate, or lesions.     NECK: Neck supple.  No thyromegaly.    CARDIAC: Normal S1 and S2. No S3, S4 or murmurs. Rhythm is regular.     RESPIRATORY:Bilateral air entry positive. Bilateral diminished breath sounds. No wheezing, crackles or rhonchi noted.    GI: Positive bowel sounds. Soft, nondistended, nontender.     MUSCULOSKELETAL: No significant deformity or joint abnormality. No edema. Peripheral pulses intact. No varicosities.    NEUROLOGICAL: Strength and sensation symmetric and intact throughout.     PSYCHIATRIC: The mental examination revealed the patient was oriented to person.    Result Review :            Assessment and Plan    Diagnoses and all orders for this visit:    1. Moderate to severe pulmonary hypertension (HCC) (Primary)      BMI is  within normal parameters. No follow-up required.      Pulmonary hypertension:    Likely due to cardiac co-morbidities.    Hypoxia was likely related to acute illness.  No shortness of breath noted.     Follow Up   Return if symptoms worsen or fail to improve.  Patient was given instructions and counseling regarding his condition or for health maintenance advice. Please see specific information pulled into the AVS if appropriate.

## 2023-01-16 ENCOUNTER — APPOINTMENT (OUTPATIENT)
Dept: GENERAL RADIOLOGY | Facility: HOSPITAL | Age: 53
End: 2023-01-16
Payer: MEDICARE

## 2023-01-16 ENCOUNTER — HOSPITAL ENCOUNTER (EMERGENCY)
Facility: HOSPITAL | Age: 53
Discharge: HOME | End: 2023-01-16
Attending: STUDENT IN AN ORGANIZED HEALTH CARE EDUCATION/TRAINING PROGRAM | Admitting: STUDENT IN AN ORGANIZED HEALTH CARE EDUCATION/TRAINING PROGRAM
Payer: MEDICARE

## 2023-01-16 VITALS
WEIGHT: 107 LBS | BODY MASS INDEX: 21.01 KG/M2 | RESPIRATION RATE: 16 BRPM | HEIGHT: 60 IN | HEART RATE: 70 BPM | TEMPERATURE: 98 F | DIASTOLIC BLOOD PRESSURE: 77 MMHG | SYSTOLIC BLOOD PRESSURE: 111 MMHG | OXYGEN SATURATION: 99 %

## 2023-01-16 DIAGNOSIS — J06.9 UPPER RESPIRATORY TRACT INFECTION, UNSPECIFIED TYPE: Primary | ICD-10-CM

## 2023-01-16 LAB
ALBUMIN SERPL-MCNC: 4 G/DL (ref 3.5–5.2)
ALBUMIN/GLOB SERPL: 1.1 G/DL
ALP SERPL-CCNC: 89 U/L (ref 39–117)
ALT SERPL W P-5'-P-CCNC: 13 U/L (ref 1–41)
ANION GAP SERPL CALCULATED.3IONS-SCNC: 12 MMOL/L (ref 5–15)
AST SERPL-CCNC: 15 U/L (ref 1–40)
BASOPHILS # BLD AUTO: 0.07 10*3/MM3 (ref 0–0.2)
BASOPHILS NFR BLD AUTO: 0.7 % (ref 0–1.5)
BILIRUB SERPL-MCNC: 0.2 MG/DL (ref 0–1.2)
BUN SERPL-MCNC: 17 MG/DL (ref 6–20)
BUN/CREAT SERPL: 18.1 (ref 7–25)
CALCIUM SPEC-SCNC: 9.4 MG/DL (ref 8.6–10.5)
CHLORIDE SERPL-SCNC: 99 MMOL/L (ref 98–107)
CO2 SERPL-SCNC: 24 MMOL/L (ref 22–29)
CREAT SERPL-MCNC: 0.94 MG/DL (ref 0.76–1.27)
DEPRECATED RDW RBC AUTO: 52 FL (ref 37–54)
EGFRCR SERPLBLD CKD-EPI 2021: 98.1 ML/MIN/1.73
EOSINOPHIL # BLD AUTO: 0.08 10*3/MM3 (ref 0–0.4)
EOSINOPHIL NFR BLD AUTO: 0.8 % (ref 0.3–6.2)
ERYTHROCYTE [DISTWIDTH] IN BLOOD BY AUTOMATED COUNT: 15.1 % (ref 12.3–15.4)
FLUAV RNA RESP QL NAA+PROBE: NOT DETECTED
FLUBV RNA RESP QL NAA+PROBE: NOT DETECTED
GLOBULIN UR ELPH-MCNC: 3.8 GM/DL
GLUCOSE SERPL-MCNC: 104 MG/DL (ref 65–99)
HCT VFR BLD AUTO: 47.3 % (ref 37.5–51)
HGB BLD-MCNC: 15.3 G/DL (ref 13–17.7)
IMM GRANULOCYTES # BLD AUTO: 0.03 10*3/MM3 (ref 0–0.05)
IMM GRANULOCYTES NFR BLD AUTO: 0.3 % (ref 0–0.5)
LYMPHOCYTES # BLD AUTO: 1.69 10*3/MM3 (ref 0.7–3.1)
LYMPHOCYTES NFR BLD AUTO: 16.7 % (ref 19.6–45.3)
MCH RBC QN AUTO: 30.2 PG (ref 26.6–33)
MCHC RBC AUTO-ENTMCNC: 32.3 G/DL (ref 31.5–35.7)
MCV RBC AUTO: 93.3 FL (ref 79–97)
MONOCYTES # BLD AUTO: 1.2 10*3/MM3 (ref 0.1–0.9)
MONOCYTES NFR BLD AUTO: 11.8 % (ref 5–12)
NEUTROPHILS NFR BLD AUTO: 69.7 % (ref 42.7–76)
NEUTROPHILS NFR BLD AUTO: 7.07 10*3/MM3 (ref 1.7–7)
NRBC BLD AUTO-RTO: 0 /100 WBC (ref 0–0.2)
PLATELET # BLD AUTO: 298 10*3/MM3 (ref 140–450)
PMV BLD AUTO: 9.8 FL (ref 6–12)
POTASSIUM SERPL-SCNC: 4.2 MMOL/L (ref 3.5–5.2)
PROT SERPL-MCNC: 7.8 G/DL (ref 6–8.5)
RBC # BLD AUTO: 5.07 10*6/MM3 (ref 4.14–5.8)
RSV RNA NPH QL NAA+NON-PROBE: DETECTED
SARS-COV-2 RNA RESP QL NAA+PROBE: NOT DETECTED
SODIUM SERPL-SCNC: 135 MMOL/L (ref 136–145)
WBC NRBC COR # BLD: 10.14 10*3/MM3 (ref 3.4–10.8)

## 2023-01-16 PROCEDURE — 85025 COMPLETE CBC W/AUTO DIFF WBC: CPT | Performed by: NURSE PRACTITIONER

## 2023-01-16 PROCEDURE — 71045 X-RAY EXAM CHEST 1 VIEW: CPT

## 2023-01-16 PROCEDURE — 36415 COLL VENOUS BLD VENIPUNCTURE: CPT

## 2023-01-16 PROCEDURE — 71045 X-RAY EXAM CHEST 1 VIEW: CPT | Performed by: RADIOLOGY

## 2023-01-16 PROCEDURE — 87637 SARSCOV2&INF A&B&RSV AMP PRB: CPT | Performed by: NURSE PRACTITIONER

## 2023-01-16 PROCEDURE — 80053 COMPREHEN METABOLIC PANEL: CPT | Performed by: NURSE PRACTITIONER

## 2023-01-16 PROCEDURE — 99283 EMERGENCY DEPT VISIT LOW MDM: CPT

## 2023-01-16 RX ORDER — AMOXICILLIN 250 MG/5ML
500 POWDER, FOR SUSPENSION ORAL 2 TIMES DAILY
Qty: 200 ML | Refills: 0 | Status: SHIPPED | OUTPATIENT
Start: 2023-01-16 | End: 2023-01-26

## 2023-01-16 NOTE — ED PROVIDER NOTES
Subjective   History of Present Illness  Patient is a 52-year-old white male presents emergency room today with complaint of cough and congestion.  Patient is accompanied by his father.  Patient does have a history of mental retardation and the father is historian.  Mother reports that patient has been sick for about a week and states that yesterday the patient did have a fever.  Patient's father reports that patient has been coughing at times.  Patient's father reports he also has similar symptoms and being seen for the same.  Patient's father has not noticed any alleviating or worsening factors.  Patient's father reports he has been giving him some over-the-counter medications without relief.  Patient's past medical history is markable for mental retardation, congenital heart disease, pulmonary pretension, atrial flutter, atrial septal defect, seizure disorder, bradycardia, AV block, and aortic insufficiency.    URI      Review of Systems   Unable to perform ROS: Patient nonverbal   Constitutional: Negative.    HENT: Negative.    Eyes: Negative.    Respiratory: Negative.    Cardiovascular: Negative.    Gastrointestinal: Negative.    Endocrine: Negative.    Genitourinary: Negative.    Musculoskeletal: Negative.    Skin: Negative.    Allergic/Immunologic: Negative.    Neurological: Negative.    Hematological: Negative.    Psychiatric/Behavioral: Negative.        Past Medical History:   Diagnosis Date   • Aortic insufficiency     MILD REGURGITAION, AND MILD TRICUSPID REGURGITAION.   • ASD (atrial septal defect)    • Atrial flutter (HCC)     NEW ONSET WTIH CHADS-VASC SCORE OF 0, HENCE LOW STROKE RISK WITHOUT ANTICOAGULATION.   • AV block, Mobitz 1    • Bradycardia     WITH A SECOND DEGREE ATRIOVENTRICULAR BLOCK TYPE 1.. 10/17/2013   • Congenital heart disease     WITH PULMONARY STENOSIS AND ATRIAL SEPTAL DEFECT REPAIR AS A CHILD. 01/25/16   • Mental retardation    • Pulmonary hypertension (HCC)     MODERATE ON LAST  "ECHOCARDIOGRAM ON OCTOBER 2014.   • Seizure disorder (HCC)        No Known Allergies    Past Surgical History:   Procedure Laterality Date   • ASD AND VSD REPAIR     • FOOT SURGERY      \"orthopedic surgery to straighten his feet\"   • HERNIA REPAIR         Family History   Problem Relation Age of Onset   • Cancer Paternal Aunt    • Cancer Paternal Uncle        Social History     Socioeconomic History   • Marital status: Single   Tobacco Use   • Smoking status: Never   • Smokeless tobacco: Never   Vaping Use   • Vaping Use: Never used   Substance and Sexual Activity   • Alcohol use: No   • Drug use: No   • Sexual activity: Defer           Objective   Physical Exam  Vitals and nursing note reviewed.   Constitutional:       Appearance: He is well-developed.   HENT:      Head: Normocephalic.      Right Ear: External ear normal.      Left Ear: External ear normal.   Eyes:      Conjunctiva/sclera: Conjunctivae normal.      Pupils: Pupils are equal, round, and reactive to light.   Cardiovascular:      Rate and Rhythm: Normal rate and regular rhythm.      Heart sounds: Normal heart sounds.   Pulmonary:      Effort: Pulmonary effort is normal.      Breath sounds: Normal breath sounds.   Abdominal:      General: Bowel sounds are normal.      Palpations: Abdomen is soft.   Musculoskeletal:         General: Normal range of motion.      Cervical back: Normal range of motion and neck supple.   Skin:     General: Skin is warm and dry.      Capillary Refill: Capillary refill takes less than 2 seconds.   Neurological:      Mental Status: He is alert.   Psychiatric:         Mood and Affect: Mood normal.         Procedures        Results for orders placed or performed during the hospital encounter of 01/16/23   COVID-19, FLU A/B, RSV PCR - Swab, Nasopharynx    Specimen: Nasopharynx; Swab   Result Value Ref Range    COVID19 Not Detected Not Detected - Ref. Range    Influenza A PCR Not Detected Not Detected    Influenza B PCR Not " Detected Not Detected    RSV, PCR Detected (A) Not Detected   Comprehensive Metabolic Panel    Specimen: Blood   Result Value Ref Range    Glucose 104 (H) 65 - 99 mg/dL    BUN 17 6 - 20 mg/dL    Creatinine 0.94 0.76 - 1.27 mg/dL    Sodium 135 (L) 136 - 145 mmol/L    Potassium 4.2 3.5 - 5.2 mmol/L    Chloride 99 98 - 107 mmol/L    CO2 24.0 22.0 - 29.0 mmol/L    Calcium 9.4 8.6 - 10.5 mg/dL    Total Protein 7.8 6.0 - 8.5 g/dL    Albumin 4.0 3.5 - 5.2 g/dL    ALT (SGPT) 13 1 - 41 U/L    AST (SGOT) 15 1 - 40 U/L    Alkaline Phosphatase 89 39 - 117 U/L    Total Bilirubin 0.2 0.0 - 1.2 mg/dL    Globulin 3.8 gm/dL    A/G Ratio 1.1 g/dL    BUN/Creatinine Ratio 18.1 7.0 - 25.0    Anion Gap 12.0 5.0 - 15.0 mmol/L    eGFR 98.1 >60.0 mL/min/1.73   CBC Auto Differential    Specimen: Blood   Result Value Ref Range    WBC 10.14 3.40 - 10.80 10*3/mm3    RBC 5.07 4.14 - 5.80 10*6/mm3    Hemoglobin 15.3 13.0 - 17.7 g/dL    Hematocrit 47.3 37.5 - 51.0 %    MCV 93.3 79.0 - 97.0 fL    MCH 30.2 26.6 - 33.0 pg    MCHC 32.3 31.5 - 35.7 g/dL    RDW 15.1 12.3 - 15.4 %    RDW-SD 52.0 37.0 - 54.0 fl    MPV 9.8 6.0 - 12.0 fL    Platelets 298 140 - 450 10*3/mm3    Neutrophil % 69.7 42.7 - 76.0 %    Lymphocyte % 16.7 (L) 19.6 - 45.3 %    Monocyte % 11.8 5.0 - 12.0 %    Eosinophil % 0.8 0.3 - 6.2 %    Basophil % 0.7 0.0 - 1.5 %    Immature Grans % 0.3 0.0 - 0.5 %    Neutrophils, Absolute 7.07 (H) 1.70 - 7.00 10*3/mm3    Lymphocytes, Absolute 1.69 0.70 - 3.10 10*3/mm3    Monocytes, Absolute 1.20 (H) 0.10 - 0.90 10*3/mm3    Eosinophils, Absolute 0.08 0.00 - 0.40 10*3/mm3    Basophils, Absolute 0.07 0.00 - 0.20 10*3/mm3    Immature Grans, Absolute 0.03 0.00 - 0.05 10*3/mm3    nRBC 0.0 0.0 - 0.2 /100 WBC       XR Chest AP   Final Result     Cardiomegaly again noted.       This report was finalized on 1/16/2023 1:24 PM by Dr. Ender Orellana MD.                ED Course                                           Medical Decision Making  Patient is a  52-year-old white male presents emergency room today with complaint of cough and congestion.  Patient is accompanied by his father.  Patient does have a history of mental retardation and the father is historian.  Mother reports that patient has been sick for about a week and states that yesterday the patient did have a fever.  Patient's father reports that patient has been coughing at times.  Patient's father reports he also has similar symptoms and being seen for the same.  Patient's father has not noticed any alleviating or worsening factors.  Patient's father reports he has been giving him some over-the-counter medications without relief.  Patient's past medical history is markable for mental retardation, congenital heart disease, pulmonary pretension, atrial flutter, atrial septal defect, seizure disorder, bradycardia, AV block, and aortic insufficiency.    Patient's workup shows RSV. Patient is to follow up with pcp and return for worsening      Upper respiratory tract infection, unspecified type: acute illness or injury  Amount and/or Complexity of Data Reviewed  Labs: ordered. Decision-making details documented in ED Course.  Radiology: ordered. Decision-making details documented in ED Course.      Risk  Prescription drug management.          Final diagnoses:   Upper respiratory tract infection, unspecified type       ED Disposition  ED Disposition     ED Disposition   Discharge    Condition   Stable    Comment   --             Jeramy Lai MD  [address] [facility] [address] [id number] [phone]    Schedule an appointment as soon as possible for a visit   For further evaluation         Medication List      New Prescriptions    amoxicillin 250 MG/5ML suspension  Commonly known as: AMOXIL  Take 10 mL by mouth 2 (Two) Times a Day for 10 days.           Where to Get Your Medications      You can get these medications from any pharmacy    Bring a paper prescription for each of these  medications  · amoxicillin 250 MG/5ML suspension         Rickie Howell, APRN  01/16/23 [id number]

## 2023-03-06 ENCOUNTER — APPOINTMENT (OUTPATIENT)
Dept: GENERAL RADIOLOGY | Facility: HOSPITAL | Age: 53
End: 2023-03-06
Payer: MEDICARE

## 2023-03-06 ENCOUNTER — HOSPITAL ENCOUNTER (EMERGENCY)
Facility: HOSPITAL | Age: 53
Discharge: HOME | End: 2023-03-06
Attending: STUDENT IN AN ORGANIZED HEALTH CARE EDUCATION/TRAINING PROGRAM | Admitting: STUDENT IN AN ORGANIZED HEALTH CARE EDUCATION/TRAINING PROGRAM
Payer: MEDICARE

## 2023-03-06 VITALS
BODY MASS INDEX: 20.62 KG/M2 | RESPIRATION RATE: 18 BRPM | WEIGHT: 105 LBS | HEART RATE: 58 BPM | OXYGEN SATURATION: 97 % | SYSTOLIC BLOOD PRESSURE: 161 MMHG | TEMPERATURE: 97.5 F | DIASTOLIC BLOOD PRESSURE: 87 MMHG | HEIGHT: 60 IN

## 2023-03-06 DIAGNOSIS — K59.00 CONSTIPATION, UNSPECIFIED CONSTIPATION TYPE: Primary | ICD-10-CM

## 2023-03-06 DIAGNOSIS — L03.316 CELLULITIS OF UMBILICUS: ICD-10-CM

## 2023-03-06 LAB
ALBUMIN SERPL-MCNC: 3.9 G/DL (ref 3.5–5.2)
ALBUMIN/GLOB SERPL: 1 G/DL
ALP SERPL-CCNC: 84 U/L (ref 39–117)
ALT SERPL W P-5'-P-CCNC: 20 U/L (ref 1–41)
ANION GAP SERPL CALCULATED.3IONS-SCNC: 12.8 MMOL/L (ref 5–15)
AST SERPL-CCNC: 21 U/L (ref 1–40)
BASOPHILS # BLD AUTO: 0.08 10*3/MM3 (ref 0–0.2)
BASOPHILS NFR BLD AUTO: 0.6 % (ref 0–1.5)
BILIRUB SERPL-MCNC: 0.2 MG/DL (ref 0–1.2)
BILIRUB UR QL STRIP: NEGATIVE
BUN SERPL-MCNC: 17 MG/DL (ref 6–20)
BUN/CREAT SERPL: 18.3 (ref 7–25)
CALCIUM SPEC-SCNC: 9.9 MG/DL (ref 8.6–10.5)
CHLORIDE SERPL-SCNC: 101 MMOL/L (ref 98–107)
CLARITY UR: CLEAR
CO2 SERPL-SCNC: 24.2 MMOL/L (ref 22–29)
COLOR UR: YELLOW
CREAT SERPL-MCNC: 0.93 MG/DL (ref 0.76–1.27)
CRP SERPL-MCNC: 1.97 MG/DL (ref 0–0.5)
DEPRECATED RDW RBC AUTO: 50.5 FL (ref 37–54)
EGFRCR SERPLBLD CKD-EPI 2021: 99.4 ML/MIN/1.73
EOSINOPHIL # BLD AUTO: 0.07 10*3/MM3 (ref 0–0.4)
EOSINOPHIL NFR BLD AUTO: 0.6 % (ref 0.3–6.2)
ERYTHROCYTE [DISTWIDTH] IN BLOOD BY AUTOMATED COUNT: 14.4 % (ref 12.3–15.4)
GLOBULIN UR ELPH-MCNC: 4.1 GM/DL
GLUCOSE SERPL-MCNC: 91 MG/DL (ref 65–99)
GLUCOSE UR STRIP-MCNC: NEGATIVE MG/DL
HCT VFR BLD AUTO: 48 % (ref 37.5–51)
HGB BLD-MCNC: 14.8 G/DL (ref 13–17.7)
HGB UR QL STRIP.AUTO: NEGATIVE
HOLD SPECIMEN: NORMAL
HOLD SPECIMEN: NORMAL
IMM GRANULOCYTES # BLD AUTO: 0.06 10*3/MM3 (ref 0–0.05)
IMM GRANULOCYTES NFR BLD AUTO: 0.5 % (ref 0–0.5)
KETONES UR QL STRIP: NEGATIVE
LEUKOCYTE ESTERASE UR QL STRIP.AUTO: NEGATIVE
LIPASE SERPL-CCNC: 30 U/L (ref 13–60)
LYMPHOCYTES # BLD AUTO: 3.67 10*3/MM3 (ref 0.7–3.1)
LYMPHOCYTES NFR BLD AUTO: 29.4 % (ref 19.6–45.3)
MCH RBC QN AUTO: 29.2 PG (ref 26.6–33)
MCHC RBC AUTO-ENTMCNC: 30.8 G/DL (ref 31.5–35.7)
MCV RBC AUTO: 94.9 FL (ref 79–97)
MONOCYTES # BLD AUTO: 1.16 10*3/MM3 (ref 0.1–0.9)
MONOCYTES NFR BLD AUTO: 9.3 % (ref 5–12)
NEUTROPHILS NFR BLD AUTO: 59.6 % (ref 42.7–76)
NEUTROPHILS NFR BLD AUTO: 7.44 10*3/MM3 (ref 1.7–7)
NITRITE UR QL STRIP: NEGATIVE
NRBC BLD AUTO-RTO: 0 /100 WBC (ref 0–0.2)
PH UR STRIP.AUTO: 6.5 [PH] (ref 5–8)
PLATELET # BLD AUTO: 336 10*3/MM3 (ref 140–450)
PMV BLD AUTO: 10.1 FL (ref 6–12)
POTASSIUM SERPL-SCNC: 4.8 MMOL/L (ref 3.5–5.2)
PROT SERPL-MCNC: 8 G/DL (ref 6–8.5)
PROT UR QL STRIP: ABNORMAL
RBC # BLD AUTO: 5.06 10*6/MM3 (ref 4.14–5.8)
SODIUM SERPL-SCNC: 138 MMOL/L (ref 136–145)
SP GR UR STRIP: 1.01 (ref 1–1.03)
UROBILINOGEN UR QL STRIP: ABNORMAL
WBC NRBC COR # BLD: 12.48 10*3/MM3 (ref 3.4–10.8)
WHOLE BLOOD HOLD COAG: NORMAL
WHOLE BLOOD HOLD SPECIMEN: NORMAL

## 2023-03-06 PROCEDURE — 74018 RADEX ABDOMEN 1 VIEW: CPT | Performed by: RADIOLOGY

## 2023-03-06 PROCEDURE — 99283 EMERGENCY DEPT VISIT LOW MDM: CPT

## 2023-03-06 PROCEDURE — 83690 ASSAY OF LIPASE: CPT | Performed by: PHYSICIAN ASSISTANT

## 2023-03-06 PROCEDURE — 80053 COMPREHEN METABOLIC PANEL: CPT | Performed by: PHYSICIAN ASSISTANT

## 2023-03-06 PROCEDURE — 81003 URINALYSIS AUTO W/O SCOPE: CPT | Performed by: PHYSICIAN ASSISTANT

## 2023-03-06 PROCEDURE — 74018 RADEX ABDOMEN 1 VIEW: CPT

## 2023-03-06 PROCEDURE — 85025 COMPLETE CBC W/AUTO DIFF WBC: CPT | Performed by: PHYSICIAN ASSISTANT

## 2023-03-06 PROCEDURE — 36415 COLL VENOUS BLD VENIPUNCTURE: CPT

## 2023-03-06 PROCEDURE — 86140 C-REACTIVE PROTEIN: CPT | Performed by: PHYSICIAN ASSISTANT

## 2023-03-06 RX ORDER — LACTULOSE 10 G/15ML
20 SOLUTION ORAL 2 TIMES DAILY
Qty: 300 ML | Refills: 0 | Status: SHIPPED | OUTPATIENT
Start: 2023-03-06

## 2023-03-06 RX ORDER — AMOXICILLIN AND CLAVULANATE POTASSIUM 600; 42.9 MG/5ML; MG/5ML
875 POWDER, FOR SUSPENSION ORAL 2 TIMES DAILY
Qty: 146 ML | Refills: 0 | Status: SHIPPED | OUTPATIENT
Start: 2023-03-06

## 2023-03-06 NOTE — ED NOTES
MEDICAL SCREENING:    Reason for Visit: constipation     Patient initially seen in triage.  The patient was advised further evaluation and diagnostic testing will be needed, some of the treatment and testing will be initiated in the lobby in order to begin the process.  The patient will be returned to the waiting area for the time being and possibly be re-assessed by a subsequent ED provider.  The patient will be brought back to the treatment area in as timely manner as possible.         Olivia Hanna PA  03/06/23 [id number]

## 2023-03-06 NOTE — ED PROVIDER NOTES
Subjective   History of Present Illness  52-year-old male with past medical history of aortic insufficiency, ASD, atrial flutter, AV block Mobitz type I, congenital heart disease, mental retardation, pulmonary hypertension, and seizure disorder presents to the emergency room accompanied by his father who is his legal guardian for constipation and erythema surrounding patient's bellybutton.  Patient's father states that he has not had a bowel movement since last night and is concerned because he is generally pretty regular.  He also states that he has noticed around patient's umbilical hernia that the area overlying it is red.  He states several days ago there was a very large erythematous area that is extending across the abdomen, however states it has improved and now is localized and just overlying the umbilical hernia.  He denies any fevers, chills, or body aches.  He has not had the erythematous rash evaluated prior to this visit.  Denies any recent illness.  Denies any specific aggravating or alleviating factors.  Denies any other complaints or concerns at this time.    History provided by:  Patient   used: No        Review of Systems   Constitutional: Negative.  Negative for fever.   HENT: Negative.    Respiratory: Negative.    Cardiovascular: Negative.  Negative for chest pain.   Gastrointestinal: Positive for constipation. Negative for abdominal pain.   Endocrine: Negative.    Genitourinary: Negative.  Negative for dysuria.   Skin: Negative.    Neurological: Negative.    Psychiatric/Behavioral: Negative.    All other systems reviewed and are negative.      Past Medical History:   Diagnosis Date   • Aortic insufficiency     MILD REGURGITAION, AND MILD TRICUSPID REGURGITAION.   • ASD (atrial septal defect)    • Atrial flutter (HCC)     NEW ONSET WTIH CHADS-VASC SCORE OF 0, HENCE LOW STROKE RISK WITHOUT ANTICOAGULATION.   • AV block, Mobitz 1    • Bradycardia     WITH A SECOND DEGREE  "ATRIOVENTRICULAR BLOCK TYPE 1.. 10/17/2013   • Congenital heart disease     WITH PULMONARY STENOSIS AND ATRIAL SEPTAL DEFECT REPAIR AS A CHILD. 01/25/16   • Mental retardation    • Pulmonary hypertension (HCC)     MODERATE ON LAST ECHOCARDIOGRAM ON OCTOBER 2014.   • Seizure disorder (HCC)        No Known Allergies    Past Surgical History:   Procedure Laterality Date   • ASD AND VSD REPAIR     • FOOT SURGERY      \"orthopedic surgery to straighten his feet\"   • HERNIA REPAIR         Family History   Problem Relation Age of Onset   • Cancer Paternal Aunt    • Cancer Paternal Uncle        Social History     Socioeconomic History   • Marital status: Single   Tobacco Use   • Smoking status: Never   • Smokeless tobacco: Never   Vaping Use   • Vaping Use: Never used   Substance and Sexual Activity   • Alcohol use: No   • Drug use: No   • Sexual activity: Defer           Objective   Physical Exam  Vitals and nursing note reviewed.   Constitutional:       General: He is awake. He is not in acute distress.     Appearance: He is well-developed. He is not ill-appearing, toxic-appearing or diaphoretic.   HENT:      Head: Normocephalic and atraumatic.      Right Ear: External ear normal.      Left Ear: External ear normal.      Nose: Nose normal.   Eyes:      Conjunctiva/sclera: Conjunctivae normal.      Pupils: Pupils are equal, round, and reactive to light.   Neck:      Vascular: No JVD.      Trachea: No tracheal deviation.   Cardiovascular:      Rate and Rhythm: Normal rate and regular rhythm.      Heart sounds: Normal heart sounds. No murmur heard.  Pulmonary:      Effort: Pulmonary effort is normal. No respiratory distress.      Breath sounds: Normal breath sounds. No wheezing.   Abdominal:      General: Bowel sounds are normal.      Palpations: Abdomen is soft.      Tenderness: There is no abdominal tenderness.       Musculoskeletal:         General: No deformity. Normal range of motion.      Cervical back: Normal range of " motion and neck supple.   Skin:     General: Skin is warm and dry.      Coloration: Skin is not pale.      Findings: No erythema or rash.   Neurological:      Mental Status: He is alert and oriented to person, place, and time.      Cranial Nerves: No cranial nerve deficit.   Psychiatric:         Behavior: Behavior normal. Behavior is cooperative.         Thought Content: Thought content normal.         Procedures           ED Course  ED Course as of 03/06/23 1831   Mon Mar 06, 2023   1504 XR Abdomen KUB [TK]      ED Course User Index  [TK] Sera Jenkins PA-C             Results for orders placed or performed during the hospital encounter of 03/06/23   Comprehensive Metabolic Panel    Specimen: Blood   Result Value Ref Range    Glucose 91 65 - 99 mg/dL    BUN 17 6 - 20 mg/dL    Creatinine 0.93 0.76 - 1.27 mg/dL    Sodium 138 136 - 145 mmol/L    Potassium 4.8 3.5 - 5.2 mmol/L    Chloride 101 98 - 107 mmol/L    CO2 24.2 22.0 - 29.0 mmol/L    Calcium 9.9 8.6 - 10.5 mg/dL    Total Protein 8.0 6.0 - 8.5 g/dL    Albumin 3.9 3.5 - 5.2 g/dL    ALT (SGPT) 20 1 - 41 U/L    AST (SGOT) 21 1 - 40 U/L    Alkaline Phosphatase 84 39 - 117 U/L    Total Bilirubin 0.2 0.0 - 1.2 mg/dL    Globulin 4.1 gm/dL    A/G Ratio 1.0 g/dL    BUN/Creatinine Ratio 18.3 7.0 - 25.0    Anion Gap 12.8 5.0 - 15.0 mmol/L    eGFR 99.4 >60.0 mL/min/1.73   Urinalysis With Microscopic If Indicated (No Culture) - Urine, Clean Catch    Specimen: Urine, Clean Catch   Result Value Ref Range    Color, UA Yellow Yellow, Straw    Appearance, UA Clear Clear    pH, UA 6.5 5.0 - 8.0    Specific Gravity, UA 1.013 1.005 - 1.030    Glucose, UA Negative Negative    Ketones, UA Negative Negative    Bilirubin, UA Negative Negative    Blood, UA Negative Negative    Protein, UA Trace (A) Negative    Leuk Esterase, UA Negative Negative    Nitrite, UA Negative Negative    Urobilinogen, UA 0.2 E.U./dL 0.2 - 1.0 E.U./dL   Lipase    Specimen: Blood   Result Value Ref Range     Lipase 30 13 - 60 U/L   CBC Auto Differential    Specimen: Blood   Result Value Ref Range    WBC 12.48 (H) 3.40 - 10.80 10*3/mm3    RBC 5.06 4.14 - 5.80 10*6/mm3    Hemoglobin 14.8 13.0 - 17.7 g/dL    Hematocrit 48.0 37.5 - 51.0 %    MCV 94.9 79.0 - 97.0 fL    MCH 29.2 26.6 - 33.0 pg    MCHC 30.8 (L) 31.5 - 35.7 g/dL    RDW 14.4 12.3 - 15.4 %    RDW-SD 50.5 37.0 - 54.0 fl    MPV 10.1 6.0 - 12.0 fL    Platelets 336 140 - 450 10*3/mm3    Neutrophil % 59.6 42.7 - 76.0 %    Lymphocyte % 29.4 19.6 - 45.3 %    Monocyte % 9.3 5.0 - 12.0 %    Eosinophil % 0.6 0.3 - 6.2 %    Basophil % 0.6 0.0 - 1.5 %    Immature Grans % 0.5 0.0 - 0.5 %    Neutrophils, Absolute 7.44 (H) 1.70 - 7.00 10*3/mm3    Lymphocytes, Absolute 3.67 (H) 0.70 - 3.10 10*3/mm3    Monocytes, Absolute 1.16 (H) 0.10 - 0.90 10*3/mm3    Eosinophils, Absolute 0.07 0.00 - 0.40 10*3/mm3    Basophils, Absolute 0.08 0.00 - 0.20 10*3/mm3    Immature Grans, Absolute 0.06 (H) 0.00 - 0.05 10*3/mm3    nRBC 0.0 0.0 - 0.2 /100 WBC   C-reactive Protein    Specimen: Blood   Result Value Ref Range    C-Reactive Protein 1.97 (H) 0.00 - 0.50 mg/dL   Green Top (Gel)   Result Value Ref Range    Extra Tube Hold for add-ons.    Lavender Top   Result Value Ref Range    Extra Tube hold for add-on    Gold Top - SST   Result Value Ref Range    Extra Tube Hold for add-ons.    Light Blue Top   Result Value Ref Range    Extra Tube Hold for add-ons.        XR Abdomen KUB   Final Result      EXAMINATION: XR ABDOMEN KUB-     CLINICAL INDICATION: constipation      COMPARISON: None available    FINDINGS:  One view of the abdomen    Moderate to large stool burden.     This report was finalized on 3/6/2023 2:56 PM by Dr. Agusto Zhang MD.                                   Medical Decision Making  52-year-old male with past medical history of aortic insufficiency, ASD, atrial flutter, AV block Mobitz type I, congenital heart disease, mental retardation, pulmonary hypertension, and seizure  disorder presents to the emergency room accompanied by his father who is his legal guardian for constipation and erythema surrounding patient's alfred.  Patient's father states that he has not had a bowel movement since last night and is concerned because he is generally pretty regular.  He also states that he has noticed around patient's umbilical hernia that the area overlying it is red.  He states several days ago there was a very large erythematous area that is extending across the abdomen, however states it has improved and now is localized and just overlying the umbilical hernia.  He denies any fevers, chills, or body aches.  He has not had the erythematous rash evaluated prior to this visit.  Denies any recent illness.  Denies any specific aggravating or alleviating factors.  Denies any other complaints or concerns at this time.    Cellulitis of umbilicus: acute illness or injury  Constipation, unspecified constipation type: acute illness or injury  Amount and/or Complexity of Data Reviewed  Labs: ordered. Decision-making details documented in ED Course.  Radiology: ordered. Decision-making details documented in ED Course.      Risk  Prescription drug management.          Final diagnoses:   Constipation, unspecified constipation type   Cellulitis of umbilicus       ED Disposition  ED Disposition     ED Disposition   Discharge    Condition   Stable    Comment   --             Jeramy Lai MD  [address] [facility] [address] [facility] [address] [phone]    In 2 days           Medication List      New Prescriptions    amoxicillin-clavulanate 600-42.9 MG/5ML suspension  Commonly known as: AUGMENTIN  Take 7.3 mL by mouth 2 (Two) Times a Day.     lactulose 10 GM/15ML solution  Commonly known as: CHRONULAC  Take 30 mL by mouth 2 (Two) Times a Day.           Where to Get Your Medications      These medications were sent to [facility] [address] [phone]  -  [phone] [address] [address]    Phone: [phone]   · amoxicillin-clavulanate 600-42.9 MG/5ML suspension  · lactulose 10 GM/15ML solution          Sera Jenkins, PAGladisC  03/06/23 1836

## 2023-04-03 ENCOUNTER — OFFICE VISIT (OUTPATIENT)
Dept: CARDIOLOGY | Facility: CLINIC | Age: 53
End: 2023-04-03
Payer: MEDICARE

## 2023-04-03 VITALS
HEART RATE: 63 BPM | BODY MASS INDEX: 20.73 KG/M2 | HEIGHT: 60 IN | WEIGHT: 105.6 LBS | DIASTOLIC BLOOD PRESSURE: 81 MMHG | SYSTOLIC BLOOD PRESSURE: 142 MMHG | OXYGEN SATURATION: 100 %

## 2023-04-03 DIAGNOSIS — I27.20 MODERATE TO SEVERE PULMONARY HYPERTENSION: ICD-10-CM

## 2023-04-03 DIAGNOSIS — R03.0 ELEVATED SYSTOLIC BLOOD PRESSURE READING WITHOUT DIAGNOSIS OF HYPERTENSION: ICD-10-CM

## 2023-04-03 DIAGNOSIS — I48.3 TYPICAL ATRIAL FLUTTER: Primary | ICD-10-CM

## 2023-04-03 PROCEDURE — 1160F RVW MEDS BY RX/DR IN RCRD: CPT | Performed by: NURSE PRACTITIONER

## 2023-04-03 PROCEDURE — 1159F MED LIST DOCD IN RCRD: CPT | Performed by: NURSE PRACTITIONER

## 2023-04-03 PROCEDURE — 99213 OFFICE O/P EST LOW 20 MIN: CPT | Performed by: NURSE PRACTITIONER

## 2023-04-03 PROCEDURE — 93000 ELECTROCARDIOGRAM COMPLETE: CPT | Performed by: NURSE PRACTITIONER

## 2023-04-03 RX ORDER — CETIRIZINE HYDROCHLORIDE 5 MG/1
TABLET ORAL
COMMUNITY
Start: 2023-03-28

## 2023-04-03 NOTE — PROGRESS NOTES
Jeramy Lai MD  Trevor Navarro  1970 04/03/2023    Patient Active Problem List   Diagnosis   • Congenital heart disease with pulmonary stenosis and atrial septal defect repain as a child.    • Moderate to severe pulmonary hypertension   • Atrial flutter, with CHADS-VASC score of 0, hence low stroke rish without anticoagulation.        Dear Jeramy Lai MD:    Subjective     Chief Complaint   Patient presents with   • Follow-up     ROUTINE           History of Present Illness:    Trevor Navarro is a 52 y.o. male with a past medical history of congenital heart disease with repair of ASD and pulmonic stenosis as a child, pulmonary hypertension, and atrial flutter with controlled ventricular rate and CHADSVASc score of 0.  He presents today for cardiology follow-up.  He does have a developmental delay and his father is present with him today.  He reports he has been doing well.  He did have an upper respiratory infection in January and had issues with hypoxia at the time.  However, that has resolved and oxygen level has been around 100%.  The patient's father states he has had no complaints of chest pains or shortness of breath.  He did see his congenital cardiologist last month.  At that time, an echocardiogram was performed and he was told everything was normal.  This is not available for my review.          No Known Allergies:      Current Outpatient Medications:   •  Cetirizine HCl (zyrTEC) 5 MG/5ML solution solution,  0 Refill(s), Disp: , Rfl:   •  lactulose (CHRONULAC) 10 GM/15ML solution, Take 30 mL by mouth 2 (Two) Times a Day., Disp: 300 mL, Rfl: 0  •  PHENobarbital (LUMINAL) 32.4 MG tablet, Take 16.2 mg by mouth 2 (two) times a day., Disp: , Rfl:   •  amoxicillin-clavulanate (AUGMENTIN) 600-42.9 MG/5ML suspension, Take 7.3 mL by mouth 2 (Two) Times a Day. (Patient not taking: Reported on 4/3/2023), Disp: 146 mL, Rfl: 0  •  aspirin 81 MG EC tablet, Take 81 mg by mouth daily., Disp:  ", Rfl:       The following portions of the patient's history were reviewed and updated as appropriate: allergies, current medications, past family history, past medical history, past social history, past surgical history and problem list.    Social History     Tobacco Use   • Smoking status: Never   • Smokeless tobacco: Never   Vaping Use   • Vaping Use: Never used   Substance Use Topics   • Alcohol use: No   • Drug use: No       Review of Systems   Unable to perform ROS: patient nonverbal       Objective   Vitals:    04/03/23 1026   BP: 142/81   Pulse: 63   SpO2: 100%   Weight: 47.9 kg (105 lb 9.6 oz)   Height: 149.9 cm (59\")     Body mass index is 21.33 kg/m².        Vitals reviewed.   Constitutional:       Appearance: Healthy appearance. Well-developed and not in distress.   HENT:      Head: Normocephalic and atraumatic.   Pulmonary:      Effort: Pulmonary effort is normal.      Breath sounds: Normal breath sounds. No wheezing. No rales.   Cardiovascular:      Normal rate. Regular rhythm.      Murmurs: There is no murmur.      . No S3 and S4 gallop.   Edema:     Peripheral edema absent.   Abdominal:      General: Bowel sounds are normal.      Palpations: Abdomen is soft.   Skin:     General: Skin is warm and dry.   Neurological:      Mental Status: Alert.      Comments: nonverbal   Psychiatric:         Mood and Affect: Mood normal.         Behavior: Behavior normal.         Lab Results   Component Value Date     03/06/2023    K 4.8 03/06/2023     03/06/2023    CO2 24.2 03/06/2023    BUN 17 03/06/2023    CREATININE 0.93 03/06/2023    GLUCOSE 91 03/06/2023    CALCIUM 9.9 03/06/2023    AST 21 03/06/2023    ALT 20 03/06/2023    ALKPHOS 84 03/06/2023     Lab Results   Component Value Date    CKTOTAL 97 12/15/2021     Lab Results   Component Value Date    WBC 12.48 (H) 03/06/2023    HGB 14.8 03/06/2023    HCT 48.0 03/06/2023     03/06/2023     No results found for: INR  No results found for: MG  Lab " Results   Component Value Date    TSH 1.270 12/15/2021    TRIG 327 (H) 03/03/2022    HDL 45 03/03/2022     (H) 03/03/2022      No results found for: BNP          ECG 12 Lead    Date/Time: 4/3/2023 10:27 AM  Performed by: Mdahavi Martins APRN  Authorized by: Madhavi Martins APRN   Comparison: compared with previous ECG   Similar to previous ECG  Rhythm: atrial flutter  BPM: 59  Conduction: incomplete right bundle branch block  Other findings: non-specific ST-T wave changes              Assessment & Plan    Diagnosis Plan   1. Typical atrial flutter  ECG 12 Lead      2. Moderate to severe pulmonary hypertension        3. Elevated systolic blood pressure reading without diagnosis of hypertension                     Recommendations:    1. Typical atrial flutter-rate controlled.  He is not anticoagulated due to [id number] score of 0.  2. Moderate to severe pulmonary hypertension-noted previously on echocardiogram.  We will request most recent echo results from cardiologist in [place].  3. Elevated SBP-his father monitors his blood pressure frequently at home and reports most of the time it does run lower around 100 systolic.  I did ask him to keep a log of blood pressure and call us if it is running greater than 140 systolic.  We will consider starting low-dose amlodipine at that time.  4. Follow-up in 1 year or sooner if needed.        Return in about 1 year (around 4/3/2024) for Recheck.    As always, I appreciate very much the opportunity to participate in the cardiovascular care of your patients.      With Best Regards,    ANNABELLE Ambriz

## 2023-04-03 NOTE — LETTER
April 3, 2023     Jeramy Lai MD  [address]  [facility] [address]    Patient: Trevor Navarro   YOB: 1970   Date of Visit: 4/3/2023       Dear Dr. Joelle MD:    Thank you for referring Trevor Navarro to me for evaluation. Below are the relevant portions of my assessment and plan of care.    If you have questions, please do not hesitate to call me. I look forward to following Trevor along with you.         Sincerely,        ANNABELLE Ambriz        CC: No Madhavi Dc APRN  04/03/23 1055  Signed  Jeramy Lai MD  Trevor Navarro  1970 04/03/2023    Patient Active Problem List   Diagnosis   • Congenital heart disease with pulmonary stenosis and atrial septal defect repain as a child.    • Moderate to severe pulmonary hypertension   • Atrial flutter, with CHADS-VASC score of 0, hence low stroke rish without anticoagulation.        Dear Jeramy Lai MD:    Subjective      Chief Complaint   Patient presents with   • Follow-up     ROUTINE           History of Present Illness:    Trevor Navarro is a 52 y.o. male with a past medical history of congenital heart disease with repair of ASD and pulmonic stenosis as a child, pulmonary hypertension, and atrial flutter with controlled ventricular rate and CHADSVASc score of 0.  He presents today for cardiology follow-up.  He does have a developmental delay and his father is present with him today.  He reports he has been doing well.  He did have an upper respiratory infection in January and had issues with hypoxia at the time.  However, that has resolved and oxygen level has been around 100%.  The patient's father states he has had no complaints of chest pains or shortness of breath.  He did see his congenital cardiologist last month.  At that time, an echocardiogram was performed and he was told everything was normal.  This is not available for my review.          No Known  "Allergies:      Current Outpatient Medications:   •  Cetirizine HCl (zyrTEC) 5 MG/5ML solution solution,  0 Refill(s), Disp: , Rfl:   •  lactulose (CHRONULAC) 10 GM/15ML solution, Take 30 mL by mouth 2 (Two) Times a Day., Disp: 300 mL, Rfl: 0  •  PHENobarbital (LUMINAL) 32.4 MG tablet, Take 16.2 mg by mouth 2 (two) times a day., Disp: , Rfl:   •  amoxicillin-clavulanate (AUGMENTIN) 600-42.9 MG/5ML suspension, Take 7.3 mL by mouth 2 (Two) Times a Day. (Patient not taking: Reported on 4/3/2023), Disp: 146 mL, Rfl: 0  •  aspirin 81 MG EC tablet, Take 81 mg by mouth daily., Disp: , Rfl:       The following portions of the patient's history were reviewed and updated as appropriate: allergies, current medications, past family history, past medical history, past social history, past surgical history and problem list.    Social History     Tobacco Use   • Smoking status: Never   • Smokeless tobacco: Never   Vaping Use   • Vaping Use: Never used   Substance Use Topics   • Alcohol use: No   • Drug use: No       Review of Systems   Unable to perform ROS: patient nonverbal       Objective    Vitals:    04/03/23 1026   BP: 142/81   Pulse: 63   SpO2: 100%   Weight: 47.9 kg (105 lb 9.6 oz)   Height: 149.9 cm (59\")     Body mass index is 21.33 kg/m².        Vitals reviewed.   Constitutional:       Appearance: Healthy appearance. Well-developed and not in distress.   HENT:      Head: Normocephalic and atraumatic.   Pulmonary:      Effort: Pulmonary effort is normal.      Breath sounds: Normal breath sounds. No wheezing. No rales.   Cardiovascular:      Normal rate. Regular rhythm.      Murmurs: There is no murmur.      . No S3 and S4 gallop.   Edema:     Peripheral edema absent.   Abdominal:      General: Bowel sounds are normal.      Palpations: Abdomen is soft.   Skin:     General: Skin is warm and dry.   Neurological:      Mental Status: Alert.      Comments: nonverbal   Psychiatric:         Mood and Affect: Mood normal.         " Behavior: Behavior normal.         Lab Results   Component Value Date     03/06/2023    K 4.8 03/06/2023     03/06/2023    CO2 24.2 03/06/2023    BUN 17 03/06/2023    CREATININE 0.93 03/06/2023    GLUCOSE 91 03/06/2023    CALCIUM 9.9 03/06/2023    AST 21 03/06/2023    ALT 20 03/06/2023    ALKPHOS 84 03/06/2023     Lab Results   Component Value Date    CKTOTAL 97 12/15/2021     Lab Results   Component Value Date    WBC 12.48 (H) 03/06/2023    HGB 14.8 03/06/2023    HCT 48.0 03/06/2023     03/06/2023     No results found for: INR  No results found for: MG  Lab Results   Component Value Date    TSH 1.270 12/15/2021    TRIG 327 (H) 03/03/2022    HDL 45 03/03/2022     (H) 03/03/2022      No results found for: BNP          ECG 12 Lead    Date/Time: 4/3/2023 10:27 AM  Performed by: Madhavi Martins APRN  Authorized by: Madhavi Martins APRN   Comparison: compared with previous ECG   Similar to previous ECG  Rhythm: atrial flutter  BPM: 59  Conduction: incomplete right bundle branch block  Other findings: non-specific ST-T wave changes              Assessment & Plan    Diagnosis Plan   1. Typical atrial flutter  ECG 12 Lead      2. Moderate to severe pulmonary hypertension        3. Elevated systolic blood pressure reading without diagnosis of hypertension                    Recommendations:    1. Typical atrial flutter-rate controlled.  He is not anticoagulated due to [id number] score of 0.  2. Moderate to severe pulmonary hypertension-noted previously on echocardiogram.  We will request most recent echo results from cardiologist in [place].  3. Elevated SBP-his father monitors his blood pressure frequently at home and reports most of the time it does run lower around 100 systolic.  I did ask him to keep a log of blood pressure and call us if it is running greater than 140 systolic.  We will consider starting low-dose amlodipine at that time.  4. Follow-up in 1 year or sooner if  needed.        Return in about 1 year (around 4/3/2024) for Recheck.    As always, I appreciate very much the opportunity to participate in the cardiovascular care of your patients.      With Best Regards,    ANNABELLE Ambriz

## 2023-04-13 ENCOUNTER — TELEPHONE (OUTPATIENT)
Dept: CARDIOLOGY | Facility: CLINIC | Age: 53
End: 2023-04-13
Payer: MEDICARE

## 2023-04-13 NOTE — TELEPHONE ENCOUNTER
Called and they stated to send a fax request for results.     ----- Message from ANNABELLE Ambriz sent at 4/3/2023 10:46 AM EDT -----  Please get echo results from Dr. Marques in [place]. Thanks.

## 2024-01-18 ENCOUNTER — LAB (OUTPATIENT)
Dept: LAB | Facility: HOSPITAL | Age: 54
End: 2024-01-18
Payer: MEDICARE

## 2024-01-18 ENCOUNTER — TRANSCRIBE ORDERS (OUTPATIENT)
Dept: ADMINISTRATIVE | Facility: HOSPITAL | Age: 54
End: 2024-01-18
Payer: MEDICARE

## 2024-01-18 DIAGNOSIS — R56.9 SEIZURES: ICD-10-CM

## 2024-01-18 DIAGNOSIS — E11.9 DIABETES MELLITUS WITHOUT COMPLICATION: ICD-10-CM

## 2024-01-18 DIAGNOSIS — E11.9 DIABETES MELLITUS WITHOUT COMPLICATION: Primary | ICD-10-CM

## 2024-01-18 LAB
ALBUMIN SERPL-MCNC: 4.4 G/DL (ref 3.5–5.2)
ALBUMIN/GLOB SERPL: 1.1 G/DL
ALP SERPL-CCNC: 90 U/L (ref 39–117)
ALT SERPL W P-5'-P-CCNC: 19 U/L (ref 1–41)
ANION GAP SERPL CALCULATED.3IONS-SCNC: 13.4 MMOL/L (ref 5–15)
AST SERPL-CCNC: 20 U/L (ref 1–40)
BASOPHILS # BLD AUTO: 0.06 10*3/MM3 (ref 0–0.2)
BASOPHILS NFR BLD AUTO: 0.7 % (ref 0–1.5)
BILIRUB SERPL-MCNC: 0.2 MG/DL (ref 0–1.2)
BUN SERPL-MCNC: 21 MG/DL (ref 6–20)
BUN/CREAT SERPL: 21.2 (ref 7–25)
CALCIUM SPEC-SCNC: 9.8 MG/DL (ref 8.6–10.5)
CHLORIDE SERPL-SCNC: 99 MMOL/L (ref 98–107)
CHOLEST SERPL-MCNC: 422 MG/DL (ref 0–200)
CO2 SERPL-SCNC: 26.6 MMOL/L (ref 22–29)
CREAT SERPL-MCNC: 0.99 MG/DL (ref 0.76–1.27)
DEPRECATED RDW RBC AUTO: 56 FL (ref 37–54)
EGFRCR SERPLBLD CKD-EPI 2021: 91.7 ML/MIN/1.73
EOSINOPHIL # BLD AUTO: 0.11 10*3/MM3 (ref 0–0.4)
EOSINOPHIL NFR BLD AUTO: 1.3 % (ref 0.3–6.2)
ERYTHROCYTE [DISTWIDTH] IN BLOOD BY AUTOMATED COUNT: 15.9 % (ref 12.3–15.4)
GLOBULIN UR ELPH-MCNC: 4 GM/DL
GLUCOSE SERPL-MCNC: 115 MG/DL (ref 65–99)
HBA1C MFR BLD: 6.6 % (ref 4.8–5.6)
HCT VFR BLD AUTO: 55 % (ref 37.5–51)
HDLC SERPL-MCNC: 51 MG/DL (ref 40–60)
HGB BLD-MCNC: 16.3 G/DL (ref 13–17.7)
IMM GRANULOCYTES # BLD AUTO: 0.02 10*3/MM3 (ref 0–0.05)
IMM GRANULOCYTES NFR BLD AUTO: 0.2 % (ref 0–0.5)
LDLC SERPL CALC-MCNC: 293 MG/DL (ref 0–100)
LDLC/HDLC SERPL: 5.96 {RATIO}
LYMPHOCYTES # BLD AUTO: 3.3 10*3/MM3 (ref 0.7–3.1)
LYMPHOCYTES NFR BLD AUTO: 39.9 % (ref 19.6–45.3)
MCH RBC QN AUTO: 28.5 PG (ref 26.6–33)
MCHC RBC AUTO-ENTMCNC: 29.6 G/DL (ref 31.5–35.7)
MCV RBC AUTO: 96.2 FL (ref 79–97)
MONOCYTES # BLD AUTO: 0.67 10*3/MM3 (ref 0.1–0.9)
MONOCYTES NFR BLD AUTO: 8.1 % (ref 5–12)
NEUTROPHILS NFR BLD AUTO: 4.11 10*3/MM3 (ref 1.7–7)
NEUTROPHILS NFR BLD AUTO: 49.8 % (ref 42.7–76)
NRBC BLD AUTO-RTO: 0 /100 WBC (ref 0–0.2)
PHENOBARB SERPL-MCNC: 14.3 MCG/ML (ref 10–30)
PLATELET # BLD AUTO: 281 10*3/MM3 (ref 140–450)
PMV BLD AUTO: 10.6 FL (ref 6–12)
POTASSIUM SERPL-SCNC: 4.5 MMOL/L (ref 3.5–5.2)
PROT SERPL-MCNC: 8.4 G/DL (ref 6–8.5)
RBC # BLD AUTO: 5.72 10*6/MM3 (ref 4.14–5.8)
SODIUM SERPL-SCNC: 139 MMOL/L (ref 136–145)
TRIGL SERPL-MCNC: 336 MG/DL (ref 0–150)
TSH SERPL DL<=0.05 MIU/L-ACNC: 1.64 UIU/ML (ref 0.27–4.2)
VLDLC SERPL-MCNC: 78 MG/DL (ref 5–40)
WBC NRBC COR # BLD AUTO: 8.27 10*3/MM3 (ref 3.4–10.8)

## 2024-01-18 PROCEDURE — 80053 COMPREHEN METABOLIC PANEL: CPT

## 2024-01-18 PROCEDURE — 83036 HEMOGLOBIN GLYCOSYLATED A1C: CPT

## 2024-01-18 PROCEDURE — 85025 COMPLETE CBC W/AUTO DIFF WBC: CPT

## 2024-01-18 PROCEDURE — 80061 LIPID PANEL: CPT

## 2024-01-18 PROCEDURE — 36415 COLL VENOUS BLD VENIPUNCTURE: CPT

## 2024-01-18 PROCEDURE — 84443 ASSAY THYROID STIM HORMONE: CPT

## 2024-01-18 PROCEDURE — 80184 ASSAY OF PHENOBARBITAL: CPT

## 2024-04-16 ENCOUNTER — OFFICE VISIT (OUTPATIENT)
Dept: CARDIOLOGY | Facility: CLINIC | Age: 54
End: 2024-04-16
Payer: MEDICARE

## 2024-04-16 VITALS
BODY MASS INDEX: 21.2 KG/M2 | SYSTOLIC BLOOD PRESSURE: 166 MMHG | HEIGHT: 60 IN | OXYGEN SATURATION: 96 % | DIASTOLIC BLOOD PRESSURE: 95 MMHG | WEIGHT: 108 LBS | HEART RATE: 71 BPM

## 2024-04-16 DIAGNOSIS — Q24.9 CONGENITAL HEART DISEASE: ICD-10-CM

## 2024-04-16 DIAGNOSIS — E78.5 DYSLIPIDEMIA: ICD-10-CM

## 2024-04-16 DIAGNOSIS — I48.3 TYPICAL ATRIAL FLUTTER: Primary | ICD-10-CM

## 2024-04-16 PROCEDURE — 1160F RVW MEDS BY RX/DR IN RCRD: CPT | Performed by: NURSE PRACTITIONER

## 2024-04-16 PROCEDURE — 99213 OFFICE O/P EST LOW 20 MIN: CPT | Performed by: NURSE PRACTITIONER

## 2024-04-16 PROCEDURE — 1159F MED LIST DOCD IN RCRD: CPT | Performed by: NURSE PRACTITIONER

## 2024-04-16 PROCEDURE — 93000 ELECTROCARDIOGRAM COMPLETE: CPT | Performed by: NURSE PRACTITIONER

## 2024-04-16 NOTE — PROGRESS NOTES
Jeramy Lai MD  Trevor Navarro  1970 04/16/2024    Patient Active Problem List   Diagnosis    Congenital heart disease with pulmonary stenosis and atrial septal defect repain as a child.     Moderate to severe pulmonary hypertension    Atrial flutter, with CHADS-VASC score of 0, hence low stroke rish without anticoagulation.        Dear Jeramy Lai MD:    Subjective     Chief Complaint   Patient presents with    Follow-up     Yearly follow up    Med Management     verbal           History of Present Illness:    Trevor Navarro is a 53 y.o. male with a past medical history of  developmental delay, congenital heart disease with repair of ASD and pulmonic stenosis as a child, pulmonary hypertension, and atrial flutter with controlled ventricular rate and CHADSVASc score of 0.  He presents today for cardiology follow-up. His father is present with him today. He reports he has been doing well. He was febrile a few days ago but this has resolved and he has had no other symptoms. His BP is elevated today but his father reports this was likely due to movement while checking his blood pressure.  His father does monitor his blood pressure at home and reports it is always less than 140/80.  He did recently have a lipid panel which revealed a TC of 422, , .  The patient has tried statins before and cannot tolerate due to severe myalgias and weakness.  His father does not want him to take an injection.  He does continue to follow with his congenital cardiologist annually.          No Known Allergies:      Current Outpatient Medications:     aspirin 81 MG EC tablet, Take 1 tablet by mouth Daily., Disp: , Rfl:     Cetirizine HCl (zyrTEC) 5 MG/5ML solution solution,  0 Refill(s), Disp: , Rfl:     lactulose (CHRONULAC) 10 GM/15ML solution, Take 30 mL by mouth 2 (Two) Times a Day., Disp: 300 mL, Rfl: 0    PHENobarbital (LUMINAL) 32.4 MG tablet, Take 0.5 tablets by mouth 2 (Two) Times a Day.,  "Disp: , Rfl:       The following portions of the patient's history were reviewed and updated as appropriate: allergies, current medications, past family history, past medical history, past social history, past surgical history and problem list.    Social History     Tobacco Use    Smoking status: Never    Smokeless tobacco: Never   Vaping Use    Vaping status: Never Used   Substance Use Topics    Alcohol use: No    Drug use: No       Review of Systems   Unable to perform ROS: Patient nonverbal       Objective   Vitals:    04/16/24 0937   BP: 166/95   BP Location: Left arm   Patient Position: Sitting   Cuff Size: Small Adult   Pulse: 71   SpO2: 96%   Weight: 49 kg (108 lb)   Height: 149.9 cm (59.02\")     Body mass index is 21.8 kg/m².        Vitals reviewed.   Constitutional:       Appearance: Healthy appearance. Well-developed and not in distress.   HENT:      Head: Normocephalic and atraumatic.   Pulmonary:      Effort: Pulmonary effort is normal.      Breath sounds: Normal breath sounds. No wheezing. No rales.   Cardiovascular:      Normal rate. Regular rhythm.      Murmurs: There is no murmur.      . No S3 and S4 gallop.   Edema:     Peripheral edema absent.   Abdominal:      General: Bowel sounds are normal.      Palpations: Abdomen is soft.   Skin:     General: Skin is warm and dry.   Neurological:      Mental Status: Alert and oriented to person, place, and time.   Psychiatric:         Mood and Affect: Mood normal.         Behavior: Behavior normal.         Lab Results   Component Value Date     01/18/2024    K 4.5 01/18/2024    CL 99 01/18/2024    CO2 26.6 01/18/2024    BUN 21 (H) 01/18/2024    CREATININE 0.99 01/18/2024    GLUCOSE 115 (H) 01/18/2024    CALCIUM 9.8 01/18/2024    AST 20 01/18/2024    ALT 19 01/18/2024    ALKPHOS 90 01/18/2024     Lab Results   Component Value Date    WBC 8.27 01/18/2024    HGB 16.3 01/18/2024    HCT 55.0 (H) 01/18/2024     01/18/2024     Lab Results   Component " Value Date    TSH 1.640 01/18/2024    TRIG 336 (H) 01/18/2024    HDL 51 01/18/2024     (H) 01/18/2024          Results for orders placed during the hospital encounter of 07/24/19    Adult Transthoracic Echo Complete W/ Cont if Necessary Per Protocol    Interpretation Summary  · The study is technically difficult for diagnosis.  · Normal left ventricular cavity size and wall thickness noted. All left ventricular wall segments contract normally.  · Estimated EF appears to be in the range of 51 - 55%.  · The aortic valve is not well visualized. The aortic valve is abnormal in structure. There is mild thickening of the noncoronary cusp, left coronary cusp and right coronary cusp of the aortic valve. No aortic valve regurgitation is present. No aortic valve stenosis is present.  · Mitral valve is not well visualized. The mitral valve is grossly normal in structure. Mild mitral valve regurgitation is present. No significant mitral valve stenosis is present.  · Tricuspid valve not well visualized. The tricuspid valve is abnormal. Mild tricuspid valve regurgitation is present. Estimated right ventricular systolic pressure from tricuspid regurgitation is markedly elevated (>55 mmHg).  · Severe pulmonary hypertension is present.  · There is no evidence of pericardial effusion.                ECG 12 Lead    Date/Time: 4/16/2024 10:04 AM  Performed by: Madhavi Martins APRN    Authorized by: Madhavi Martins APRN  Comparison: compared with previous ECG   Similar to previous ECG  Rhythm: atrial flutter  BPM: 61  Other findings: non-specific ST-T wave changes            Assessment & Plan    Diagnosis Plan   1. Typical atrial flutter  ECG 12 Lead      2. Congenital heart disease with pulmonary stenosis and atrial septal defect repain as a child.   ECG 12 Lead      3. Dyslipidemia  ECG 12 Lead                   Recommendations:    Typical atrial flutter - rate controlled. CHADSVASc score of 0 , thus not  anticoagulated.  Congenital heart disease - follows with congenital cardiology, Dr. Marques in [place], [place].  Dyslipidemia - intolerant to statins. Patient's father does not think he would tolerate injections.  Follow up in 1 year or sooner if needed.         Return in about 1 year (around 4/16/2025) for Recheck.    As always, I appreciate very much the opportunity to participate in the cardiovascular care of your patients.      With Best Regards,    ANNABELLE Ambriz

## 2025-01-29 ENCOUNTER — TRANSCRIBE ORDERS (OUTPATIENT)
Dept: ADMINISTRATIVE | Facility: HOSPITAL | Age: 55
End: 2025-01-29
Payer: MEDICARE

## 2025-01-29 ENCOUNTER — LAB (OUTPATIENT)
Dept: LAB | Facility: HOSPITAL | Age: 55
End: 2025-01-29
Payer: MEDICARE

## 2025-01-29 DIAGNOSIS — E11.9 DIABETES MELLITUS WITHOUT COMPLICATION: ICD-10-CM

## 2025-01-29 DIAGNOSIS — G40.309 EPILEPSY, GENERALIZED, NONCONVULSIVE: ICD-10-CM

## 2025-01-29 DIAGNOSIS — G40.309 EPILEPSY, GENERALIZED, NONCONVULSIVE: Primary | ICD-10-CM

## 2025-01-29 LAB
BASOPHILS # BLD AUTO: 0.05 10*3/MM3 (ref 0–0.2)
BASOPHILS NFR BLD AUTO: 0.7 % (ref 0–1.5)
DEPRECATED RDW RBC AUTO: 49.1 FL (ref 37–54)
EOSINOPHIL # BLD AUTO: 0.06 10*3/MM3 (ref 0–0.4)
EOSINOPHIL NFR BLD AUTO: 0.9 % (ref 0.3–6.2)
ERYTHROCYTE [DISTWIDTH] IN BLOOD BY AUTOMATED COUNT: 15.3 % (ref 12.3–15.4)
HBA1C MFR BLD: 6.7 % (ref 4.8–5.6)
HCT VFR BLD AUTO: 49 % (ref 37.5–51)
HGB BLD-MCNC: 15 G/DL (ref 13–17.7)
IMM GRANULOCYTES # BLD AUTO: 0.02 10*3/MM3 (ref 0–0.05)
IMM GRANULOCYTES NFR BLD AUTO: 0.3 % (ref 0–0.5)
LYMPHOCYTES # BLD AUTO: 2.2 10*3/MM3 (ref 0.7–3.1)
LYMPHOCYTES NFR BLD AUTO: 33 % (ref 19.6–45.3)
MCH RBC QN AUTO: 27 PG (ref 26.6–33)
MCHC RBC AUTO-ENTMCNC: 30.6 G/DL (ref 31.5–35.7)
MCV RBC AUTO: 88.1 FL (ref 79–97)
MONOCYTES # BLD AUTO: 0.53 10*3/MM3 (ref 0.1–0.9)
MONOCYTES NFR BLD AUTO: 7.9 % (ref 5–12)
NEUTROPHILS NFR BLD AUTO: 3.81 10*3/MM3 (ref 1.7–7)
NEUTROPHILS NFR BLD AUTO: 57.2 % (ref 42.7–76)
NRBC BLD AUTO-RTO: 0 /100 WBC (ref 0–0.2)
PLATELET # BLD AUTO: 281 10*3/MM3 (ref 140–450)
PMV BLD AUTO: 10.3 FL (ref 6–12)
RBC # BLD AUTO: 5.56 10*6/MM3 (ref 4.14–5.8)
WBC NRBC COR # BLD AUTO: 6.67 10*3/MM3 (ref 3.4–10.8)

## 2025-01-29 PROCEDURE — 36415 COLL VENOUS BLD VENIPUNCTURE: CPT

## 2025-01-29 PROCEDURE — 85025 COMPLETE CBC W/AUTO DIFF WBC: CPT

## 2025-01-29 PROCEDURE — 83036 HEMOGLOBIN GLYCOSYLATED A1C: CPT

## 2025-02-05 ENCOUNTER — LAB (OUTPATIENT)
Dept: LAB | Facility: HOSPITAL | Age: 55
End: 2025-02-05
Payer: MEDICARE

## 2025-02-05 DIAGNOSIS — E11.9 DIABETES MELLITUS WITHOUT COMPLICATION: ICD-10-CM

## 2025-02-05 DIAGNOSIS — G40.309 EPILEPSY, GENERALIZED, NONCONVULSIVE: ICD-10-CM

## 2025-02-05 LAB
ALBUMIN SERPL-MCNC: 4.4 G/DL (ref 3.5–5.2)
ALBUMIN/GLOB SERPL: 1.1 G/DL
ALP SERPL-CCNC: 86 U/L (ref 39–117)
ALT SERPL W P-5'-P-CCNC: 22 U/L (ref 1–41)
ANION GAP SERPL CALCULATED.3IONS-SCNC: 10.7 MMOL/L (ref 5–15)
AST SERPL-CCNC: 23 U/L (ref 1–40)
BILIRUB SERPL-MCNC: 0.2 MG/DL (ref 0–1.2)
BUN SERPL-MCNC: 18 MG/DL (ref 6–20)
BUN/CREAT SERPL: 18.2 (ref 7–25)
CALCIUM SPEC-SCNC: 9.5 MG/DL (ref 8.6–10.5)
CHLORIDE SERPL-SCNC: 97 MMOL/L (ref 98–107)
CHOLEST SERPL-MCNC: 400 MG/DL (ref 0–200)
CO2 SERPL-SCNC: 27.3 MMOL/L (ref 22–29)
CREAT SERPL-MCNC: 0.99 MG/DL (ref 0.76–1.27)
EGFRCR SERPLBLD CKD-EPI 2021: 91.1 ML/MIN/1.73
GLOBULIN UR ELPH-MCNC: 3.9 GM/DL
GLUCOSE SERPL-MCNC: 115 MG/DL (ref 65–99)
HDLC SERPL-MCNC: 43 MG/DL (ref 40–60)
LDLC SERPL CALC-MCNC: 283 MG/DL (ref 0–100)
LDLC/HDLC SERPL: 6.8 {RATIO}
PHENYTOIN SERPL-MCNC: <0.8 MCG/ML (ref 10–20)
POTASSIUM SERPL-SCNC: 4.6 MMOL/L (ref 3.5–5.2)
PROT SERPL-MCNC: 8.3 G/DL (ref 6–8.5)
SODIUM SERPL-SCNC: 135 MMOL/L (ref 136–145)
TRIGL SERPL-MCNC: 322 MG/DL (ref 0–150)
TSH SERPL DL<=0.05 MIU/L-ACNC: 1.17 UIU/ML (ref 0.27–4.2)
VLDLC SERPL-MCNC: 74 MG/DL (ref 5–40)

## 2025-02-05 PROCEDURE — 80185 ASSAY OF PHENYTOIN TOTAL: CPT

## 2025-02-05 PROCEDURE — 36415 COLL VENOUS BLD VENIPUNCTURE: CPT

## 2025-02-05 PROCEDURE — 80061 LIPID PANEL: CPT

## 2025-02-05 PROCEDURE — 80184 ASSAY OF PHENOBARBITAL: CPT

## 2025-02-05 PROCEDURE — 80053 COMPREHEN METABOLIC PANEL: CPT

## 2025-02-05 PROCEDURE — 84443 ASSAY THYROID STIM HORMONE: CPT

## 2025-02-06 LAB — PHENOBARB SERPL-MCNC: 15.4 MCG/ML (ref 10–30)

## 2025-04-15 ENCOUNTER — OFFICE VISIT (OUTPATIENT)
Dept: CARDIOLOGY | Facility: CLINIC | Age: 55
End: 2025-04-15
Payer: MEDICARE

## 2025-04-15 VITALS
SYSTOLIC BLOOD PRESSURE: 151 MMHG | WEIGHT: 113.6 LBS | BODY MASS INDEX: 22.3 KG/M2 | HEIGHT: 60 IN | DIASTOLIC BLOOD PRESSURE: 80 MMHG | HEART RATE: 56 BPM

## 2025-04-15 DIAGNOSIS — Q24.9 CONGENITAL HEART DISEASE: Primary | ICD-10-CM

## 2025-04-15 DIAGNOSIS — I48.3 TYPICAL ATRIAL FLUTTER: ICD-10-CM

## 2025-04-15 PROCEDURE — 93000 ELECTROCARDIOGRAM COMPLETE: CPT | Performed by: NURSE PRACTITIONER

## 2025-04-15 PROCEDURE — 1159F MED LIST DOCD IN RCRD: CPT | Performed by: NURSE PRACTITIONER

## 2025-04-15 PROCEDURE — 1160F RVW MEDS BY RX/DR IN RCRD: CPT | Performed by: NURSE PRACTITIONER

## 2025-04-15 PROCEDURE — 99214 OFFICE O/P EST MOD 30 MIN: CPT | Performed by: NURSE PRACTITIONER

## 2025-04-15 RX ORDER — LEVOCETIRIZINE DIHYDROCHLORIDE 0.5 MG/ML
SOLUTION ORAL
COMMUNITY
Start: 2025-04-01

## 2025-04-15 NOTE — PROGRESS NOTES
Jeramy Lai MD  Trevor Navarro  1970  04/15/2025    Patient Active Problem List   Diagnosis    Congenital heart disease with pulmonary stenosis and atrial septal defect repain as a child.     Moderate to severe pulmonary hypertension    Atrial flutter, with CHADS-VASC score of 0, hence low stroke rish without anticoagulation.        Dear Jeramy Lai MD:    Subjective     Chief Complaint   Patient presents with    Follow-up     ROUTINE         History of Present Illness:    Trevor Navarro is a 54 y.o. male with a past medical history of developmental delay, congenital heart disease with repair of ASD and pulmonic stenosis as a child, pulmonary hypertension, and atrial flutter with controlled ventricular rate and CHADSVASc score of 0.  He presents today for cardiology follow-up.  His father is present with him.  Reports he has had recent evaluation by Dr. Herminio Marques, his congenital cardiologist in [place], [place].  He will be scheduled for routine echocardiogram in 2026.  His father reports he has been doing well with no complaints of shortness of breath or chest pains.  His blood pressure has been well-controlled at home, usually around 110-130 systolic.  He has a history of dyslipidemia but is intolerant to statins and family has declined injections in the past.          No Known Allergies:      Current Outpatient Medications:     aspirin 81 MG EC tablet, Take 1 tablet by mouth Daily., Disp: , Rfl:     PHENobarbital (LUMINAL) 32.4 MG tablet, Take 0.5 tablets by mouth 2 (Two) Times a Day., Disp: , Rfl:     Xyzal Allergy 24HR Childrens 2.5 MG/5ML solution, take 10ml BY MOUTH ONCE a DAY FOR 30 DAYS, Disp: , Rfl:     lactulose (CHRONULAC) 10 GM/15ML solution, Take 30 mL by mouth 2 (Two) Times a Day. (Patient not taking: Reported on 4/15/2025), Disp: 300 mL, Rfl: 0      The following portions of the patient's history were reviewed and updated as appropriate: allergies, current medications,  "past family history, past medical history, past social history, past surgical history and problem list.    Social History     Tobacco Use    Smoking status: Never    Smokeless tobacco: Never   Vaping Use    Vaping status: Never Used   Substance Use Topics    Alcohol use: No    Drug use: No       Review of Systems   Unable to perform ROS: Patient nonverbal       Objective   Vitals:    04/15/25 1006   BP: 151/80   Pulse: 56   Weight: 51.5 kg (113 lb 9.6 oz)   Height: 149.9 cm (59\")     Body mass index is 22.94 kg/m².        Vitals reviewed.   Constitutional:       Appearance: Healthy appearance. Well-developed and not in distress.   HENT:      Head: Normocephalic and atraumatic.   Pulmonary:      Effort: Pulmonary effort is normal.      Breath sounds: Normal breath sounds. No wheezing. No rales.   Cardiovascular:      Normal rate. Regular rhythm.      Murmurs: There is no murmur.      . No S3 and S4 gallop.   Edema:     Peripheral edema absent.   Abdominal:      General: Bowel sounds are normal.      Palpations: Abdomen is soft.   Skin:     General: Skin is warm and dry.   Neurological:      Mental Status: Alert and oriented to person, place, and time.      Comments: nonverbal   Psychiatric:         Mood and Affect: Mood normal.         Behavior: Behavior normal.         Lab Results   Component Value Date     (L) 02/05/2025    K 4.6 02/05/2025    CL 97 (L) 02/05/2025    CO2 27.3 02/05/2025    BUN 18 02/05/2025    CREATININE 0.99 02/05/2025    GLUCOSE 115 (H) 02/05/2025    CALCIUM 9.5 02/05/2025    AST 23 02/05/2025    ALT 22 02/05/2025    ALKPHOS 86 02/05/2025     Lab Results   Component Value Date    WBC 6.67 01/29/2025    HGB 15.0 01/29/2025    HCT 49.0 01/29/2025     01/29/2025     Lab Results   Component Value Date    TSH 1.170 02/05/2025    TRIG 322 (H) 02/05/2025    HDL 43 02/05/2025     (H) 02/05/2025          Results for orders placed during the hospital encounter of 07/24/19    Adult " Transthoracic Echo Complete W/ Cont if Necessary Per Protocol    Interpretation Summary  · The study is technically difficult for diagnosis.  · Normal left ventricular cavity size and wall thickness noted. All left ventricular wall segments contract normally.  · Estimated EF appears to be in the range of 51 - 55%.  · The aortic valve is not well visualized. The aortic valve is abnormal in structure. There is mild thickening of the noncoronary cusp, left coronary cusp and right coronary cusp of the aortic valve. No aortic valve regurgitation is present. No aortic valve stenosis is present.  · Mitral valve is not well visualized. The mitral valve is grossly normal in structure. Mild mitral valve regurgitation is present. No significant mitral valve stenosis is present.  · Tricuspid valve not well visualized. The tricuspid valve is abnormal. Mild tricuspid valve regurgitation is present. Estimated right ventricular systolic pressure from tricuspid regurgitation is markedly elevated (>55 mmHg).  · Severe pulmonary hypertension is present.  · There is no evidence of pericardial effusion.                ECG 12 Lead    Date/Time: 4/15/2025 11:05 AM  Performed by: Madhavi Martins APRN    Authorized by: Madhavi Martins APRN  Comparison: compared with previous ECG   Rhythm: atrial flutter  BPM: 57  Other findings: non-specific ST-T wave changes          Assessment & Plan    Diagnosis Plan   1. Congenital heart disease with pulmonary stenosis and atrial septal defect repair as a child.  ECG 12 Lead      2. Typical atrial flutter  ECG 12 Lead               Recommendations:  Congenital heart disease-following with congenital cardiology in [place], [place]  Typical atrial flutter-rate controlled.  Patient is not anticoagulated due to [id number] of 0.  Follow-up in 1 year or sooner if needed.      Return in about 1 year (around 4/15/2026) for Recheck.    As always, I appreciate very much the opportunity to participate  in the cardiovascular care of your patients.      With Best Regards,    ANNABELLE Ambriz